# Patient Record
Sex: MALE | Race: BLACK OR AFRICAN AMERICAN | NOT HISPANIC OR LATINO | URBAN - METROPOLITAN AREA
[De-identification: names, ages, dates, MRNs, and addresses within clinical notes are randomized per-mention and may not be internally consistent; named-entity substitution may affect disease eponyms.]

---

## 2024-10-29 ENCOUNTER — INPATIENT (INPATIENT)
Facility: HOSPITAL | Age: 59
LOS: 3 days | Discharge: LEFT BEFORE DISCHARGE | End: 2024-11-02
Attending: HOSPITALIST | Admitting: HOSPITALIST
Payer: MEDICARE

## 2024-10-29 PROCEDURE — 99285 EMERGENCY DEPT VISIT HI MDM: CPT

## 2024-10-30 VITALS
HEART RATE: 125 BPM | WEIGHT: 229.94 LBS | DIASTOLIC BLOOD PRESSURE: 69 MMHG | SYSTOLIC BLOOD PRESSURE: 152 MMHG | OXYGEN SATURATION: 98 % | TEMPERATURE: 98 F | RESPIRATION RATE: 18 BRPM | HEIGHT: 67 IN

## 2024-10-30 DIAGNOSIS — I48.0 PAROXYSMAL ATRIAL FIBRILLATION: ICD-10-CM

## 2024-10-30 DIAGNOSIS — F11.90 OPIOID USE, UNSPECIFIED, UNCOMPLICATED: ICD-10-CM

## 2024-10-30 DIAGNOSIS — Z98.890 OTHER SPECIFIED POSTPROCEDURAL STATES: Chronic | ICD-10-CM

## 2024-10-30 DIAGNOSIS — Z29.9 ENCOUNTER FOR PROPHYLACTIC MEASURES, UNSPECIFIED: ICD-10-CM

## 2024-10-30 DIAGNOSIS — F10.239 ALCOHOL DEPENDENCE WITH WITHDRAWAL, UNSPECIFIED: ICD-10-CM

## 2024-10-30 DIAGNOSIS — E11.9 TYPE 2 DIABETES MELLITUS WITHOUT COMPLICATIONS: ICD-10-CM

## 2024-10-30 LAB
ADD ON TEST-SPECIMEN IN LAB: SIGNIFICANT CHANGE UP
ALBUMIN SERPL ELPH-MCNC: 3.5 G/DL — SIGNIFICANT CHANGE UP (ref 3.3–5)
ALBUMIN SERPL ELPH-MCNC: 3.9 G/DL — SIGNIFICANT CHANGE UP (ref 3.3–5)
ALP SERPL-CCNC: 103 U/L — SIGNIFICANT CHANGE UP (ref 40–120)
ALP SERPL-CCNC: 89 U/L — SIGNIFICANT CHANGE UP (ref 40–120)
ALT FLD-CCNC: 33 U/L — SIGNIFICANT CHANGE UP (ref 4–41)
ALT FLD-CCNC: 40 U/L — SIGNIFICANT CHANGE UP (ref 4–41)
AMPHET UR-MCNC: NEGATIVE — SIGNIFICANT CHANGE UP
ANION GAP SERPL CALC-SCNC: 13 MMOL/L — SIGNIFICANT CHANGE UP (ref 7–14)
ANION GAP SERPL CALC-SCNC: 15 MMOL/L — HIGH (ref 7–14)
AST SERPL-CCNC: 24 U/L — SIGNIFICANT CHANGE UP (ref 4–40)
AST SERPL-CCNC: 33 U/L — SIGNIFICANT CHANGE UP (ref 4–40)
BARBITURATES UR SCN-MCNC: NEGATIVE — SIGNIFICANT CHANGE UP
BASOPHILS # BLD AUTO: 0.07 K/UL — SIGNIFICANT CHANGE UP (ref 0–0.2)
BASOPHILS # BLD AUTO: 0.1 K/UL — SIGNIFICANT CHANGE UP (ref 0–0.2)
BASOPHILS NFR BLD AUTO: 0.8 % — SIGNIFICANT CHANGE UP (ref 0–2)
BASOPHILS NFR BLD AUTO: 1.1 % — SIGNIFICANT CHANGE UP (ref 0–2)
BENZODIAZ UR-MCNC: NEGATIVE — SIGNIFICANT CHANGE UP
BILIRUB SERPL-MCNC: 0.2 MG/DL — SIGNIFICANT CHANGE UP (ref 0.2–1.2)
BILIRUB SERPL-MCNC: 0.3 MG/DL — SIGNIFICANT CHANGE UP (ref 0.2–1.2)
BUN SERPL-MCNC: 10 MG/DL — SIGNIFICANT CHANGE UP (ref 7–23)
BUN SERPL-MCNC: 8 MG/DL — SIGNIFICANT CHANGE UP (ref 7–23)
CALCIUM SERPL-MCNC: 8 MG/DL — LOW (ref 8.4–10.5)
CALCIUM SERPL-MCNC: 8.4 MG/DL — SIGNIFICANT CHANGE UP (ref 8.4–10.5)
CHLORIDE SERPL-SCNC: 101 MMOL/L — SIGNIFICANT CHANGE UP (ref 98–107)
CHLORIDE SERPL-SCNC: 101 MMOL/L — SIGNIFICANT CHANGE UP (ref 98–107)
CO2 SERPL-SCNC: 25 MMOL/L — SIGNIFICANT CHANGE UP (ref 22–31)
CO2 SERPL-SCNC: 26 MMOL/L — SIGNIFICANT CHANGE UP (ref 22–31)
COCAINE METAB.OTHER UR-MCNC: POSITIVE
CREAT SERPL-MCNC: 0.65 MG/DL — SIGNIFICANT CHANGE UP (ref 0.5–1.3)
CREAT SERPL-MCNC: 0.72 MG/DL — SIGNIFICANT CHANGE UP (ref 0.5–1.3)
CREATININE URINE RESULT, DAU: 237 MG/DL — SIGNIFICANT CHANGE UP
EGFR: 106 ML/MIN/1.73M2 — SIGNIFICANT CHANGE UP
EGFR: 109 ML/MIN/1.73M2 — SIGNIFICANT CHANGE UP
EOSINOPHIL # BLD AUTO: 0.47 K/UL — SIGNIFICANT CHANGE UP (ref 0–0.5)
EOSINOPHIL # BLD AUTO: 0.56 K/UL — HIGH (ref 0–0.5)
EOSINOPHIL NFR BLD AUTO: 5.3 % — SIGNIFICANT CHANGE UP (ref 0–6)
EOSINOPHIL NFR BLD AUTO: 6.3 % — HIGH (ref 0–6)
ETHANOL SERPL-MCNC: 135 MG/DL — HIGH
FENTANYL UR QL SCN: POSITIVE
GLUCOSE BLDC GLUCOMTR-MCNC: 118 MG/DL — HIGH (ref 70–99)
GLUCOSE BLDC GLUCOMTR-MCNC: 87 MG/DL — SIGNIFICANT CHANGE UP (ref 70–99)
GLUCOSE SERPL-MCNC: 108 MG/DL — HIGH (ref 70–99)
GLUCOSE SERPL-MCNC: 129 MG/DL — HIGH (ref 70–99)
HCT VFR BLD CALC: 37.1 % — LOW (ref 39–50)
HCT VFR BLD CALC: 41 % — SIGNIFICANT CHANGE UP (ref 39–50)
HGB BLD-MCNC: 11.9 G/DL — LOW (ref 13–17)
HGB BLD-MCNC: 13.5 G/DL — SIGNIFICANT CHANGE UP (ref 13–17)
IANC: 5.04 K/UL — SIGNIFICANT CHANGE UP (ref 1.8–7.4)
IANC: 5.29 K/UL — SIGNIFICANT CHANGE UP (ref 1.8–7.4)
IMM GRANULOCYTES NFR BLD AUTO: 0.3 % — SIGNIFICANT CHANGE UP (ref 0–0.9)
IMM GRANULOCYTES NFR BLD AUTO: 0.6 % — SIGNIFICANT CHANGE UP (ref 0–0.9)
LIDOCAIN IGE QN: 29 U/L — SIGNIFICANT CHANGE UP (ref 7–60)
LYMPHOCYTES # BLD AUTO: 2.03 K/UL — SIGNIFICANT CHANGE UP (ref 1–3.3)
LYMPHOCYTES # BLD AUTO: 2.22 K/UL — SIGNIFICANT CHANGE UP (ref 1–3.3)
LYMPHOCYTES # BLD AUTO: 22.9 % — SIGNIFICANT CHANGE UP (ref 13–44)
LYMPHOCYTES # BLD AUTO: 25 % — SIGNIFICANT CHANGE UP (ref 13–44)
MAGNESIUM SERPL-MCNC: 1.4 MG/DL — LOW (ref 1.6–2.6)
MAGNESIUM SERPL-MCNC: 1.7 MG/DL — SIGNIFICANT CHANGE UP (ref 1.6–2.6)
MCHC RBC-ENTMCNC: 30.1 PG — SIGNIFICANT CHANGE UP (ref 27–34)
MCHC RBC-ENTMCNC: 30.5 PG — SIGNIFICANT CHANGE UP (ref 27–34)
MCHC RBC-ENTMCNC: 32.1 G/DL — SIGNIFICANT CHANGE UP (ref 32–36)
MCHC RBC-ENTMCNC: 32.9 G/DL — SIGNIFICANT CHANGE UP (ref 32–36)
MCV RBC AUTO: 92.8 FL — SIGNIFICANT CHANGE UP (ref 80–100)
MCV RBC AUTO: 93.7 FL — SIGNIFICANT CHANGE UP (ref 80–100)
METHADONE UR-MCNC: POSITIVE
MONOCYTES # BLD AUTO: 0.86 K/UL — SIGNIFICANT CHANGE UP (ref 0–0.9)
MONOCYTES # BLD AUTO: 1.03 K/UL — HIGH (ref 0–0.9)
MONOCYTES NFR BLD AUTO: 11.6 % — SIGNIFICANT CHANGE UP (ref 2–14)
MONOCYTES NFR BLD AUTO: 9.7 % — SIGNIFICANT CHANGE UP (ref 2–14)
MRSA PCR RESULT.: SIGNIFICANT CHANGE UP
NEUTROPHILS # BLD AUTO: 5.04 K/UL — SIGNIFICANT CHANGE UP (ref 1.8–7.4)
NEUTROPHILS # BLD AUTO: 5.29 K/UL — SIGNIFICANT CHANGE UP (ref 1.8–7.4)
NEUTROPHILS NFR BLD AUTO: 56.7 % — SIGNIFICANT CHANGE UP (ref 43–77)
NEUTROPHILS NFR BLD AUTO: 59.7 % — SIGNIFICANT CHANGE UP (ref 43–77)
NRBC # BLD: 0 /100 WBCS — SIGNIFICANT CHANGE UP (ref 0–0)
NRBC # BLD: 0 /100 WBCS — SIGNIFICANT CHANGE UP (ref 0–0)
NRBC # FLD: 0 K/UL — SIGNIFICANT CHANGE UP (ref 0–0)
NRBC # FLD: 0 K/UL — SIGNIFICANT CHANGE UP (ref 0–0)
OPIATES UR-MCNC: POSITIVE
OXYCODONE UR-MCNC: NEGATIVE — SIGNIFICANT CHANGE UP
PCP SPEC-MCNC: SIGNIFICANT CHANGE UP
PCP UR-MCNC: NEGATIVE — SIGNIFICANT CHANGE UP
PHOSPHATE SERPL-MCNC: 2.8 MG/DL — SIGNIFICANT CHANGE UP (ref 2.5–4.5)
PHOSPHATE SERPL-MCNC: 2.9 MG/DL — SIGNIFICANT CHANGE UP (ref 2.5–4.5)
PLATELET # BLD AUTO: 392 K/UL — SIGNIFICANT CHANGE UP (ref 150–400)
PLATELET # BLD AUTO: 464 K/UL — HIGH (ref 150–400)
POTASSIUM SERPL-MCNC: 3.1 MMOL/L — LOW (ref 3.5–5.3)
POTASSIUM SERPL-MCNC: 3.2 MMOL/L — LOW (ref 3.5–5.3)
POTASSIUM SERPL-SCNC: 3.1 MMOL/L — LOW (ref 3.5–5.3)
POTASSIUM SERPL-SCNC: 3.2 MMOL/L — LOW (ref 3.5–5.3)
PROT SERPL-MCNC: 5.7 G/DL — LOW (ref 6–8.3)
PROT SERPL-MCNC: 6.7 G/DL — SIGNIFICANT CHANGE UP (ref 6–8.3)
RBC # BLD: 3.96 M/UL — LOW (ref 4.2–5.8)
RBC # BLD: 4.42 M/UL — SIGNIFICANT CHANGE UP (ref 4.2–5.8)
RBC # FLD: 14.2 % — SIGNIFICANT CHANGE UP (ref 10.3–14.5)
RBC # FLD: 14.4 % — SIGNIFICANT CHANGE UP (ref 10.3–14.5)
S AUREUS DNA NOSE QL NAA+PROBE: SIGNIFICANT CHANGE UP
SODIUM SERPL-SCNC: 140 MMOL/L — SIGNIFICANT CHANGE UP (ref 135–145)
SODIUM SERPL-SCNC: 141 MMOL/L — SIGNIFICANT CHANGE UP (ref 135–145)
THC UR QL: NEGATIVE — SIGNIFICANT CHANGE UP
TROPONIN T, HIGH SENSITIVITY RESULT: 10 NG/L — SIGNIFICANT CHANGE UP
WBC # BLD: 8.86 K/UL — SIGNIFICANT CHANGE UP (ref 3.8–10.5)
WBC # BLD: 8.89 K/UL — SIGNIFICANT CHANGE UP (ref 3.8–10.5)
WBC # FLD AUTO: 8.86 K/UL — SIGNIFICANT CHANGE UP (ref 3.8–10.5)
WBC # FLD AUTO: 8.89 K/UL — SIGNIFICANT CHANGE UP (ref 3.8–10.5)

## 2024-10-30 PROCEDURE — 99223 1ST HOSP IP/OBS HIGH 75: CPT

## 2024-10-30 RX ORDER — GLUCAGON INJECTION, SOLUTION 1 MG/.2ML
1 INJECTION, SOLUTION SUBCUTANEOUS ONCE
Refills: 0 | Status: DISCONTINUED | OUTPATIENT
Start: 2024-10-30 | End: 2024-11-02

## 2024-10-30 RX ORDER — CHLORDIAZEPOXIDE HCL 10 MG
25 CAPSULE ORAL ONCE
Refills: 0 | Status: DISCONTINUED | OUTPATIENT
Start: 2024-10-30 | End: 2024-10-30

## 2024-10-30 RX ORDER — CHLORDIAZEPOXIDE HCL 10 MG
50 CAPSULE ORAL EVERY 6 HOURS
Refills: 0 | Status: DISCONTINUED | OUTPATIENT
Start: 2024-10-30 | End: 2024-10-31

## 2024-10-30 RX ORDER — MAGNESIUM SULFATE IN 0.9% NACL 2 G/50 ML
2 INTRAVENOUS SOLUTION, PIGGYBACK (ML) INTRAVENOUS ONCE
Refills: 0 | Status: COMPLETED | OUTPATIENT
Start: 2024-10-30 | End: 2024-10-30

## 2024-10-30 RX ORDER — CHLORDIAZEPOXIDE HCL 10 MG
50 CAPSULE ORAL EVERY 8 HOURS
Refills: 0 | Status: DISCONTINUED | OUTPATIENT
Start: 2024-10-31 | End: 2024-11-01

## 2024-10-30 RX ORDER — LORAZEPAM 2 MG
2 TABLET ORAL EVERY 4 HOURS
Refills: 0 | Status: DISCONTINUED | OUTPATIENT
Start: 2024-10-30 | End: 2024-10-30

## 2024-10-30 RX ORDER — DIAZEPAM 10 MG/1
5 FILM BUCCAL ONCE
Refills: 0 | Status: DISCONTINUED | OUTPATIENT
Start: 2024-10-30 | End: 2024-10-30

## 2024-10-30 RX ORDER — CLOPIDOGREL 75 MG/1
75 TABLET ORAL DAILY
Refills: 0 | Status: DISCONTINUED | OUTPATIENT
Start: 2024-10-30 | End: 2024-11-02

## 2024-10-30 RX ORDER — MONTELUKAST SODIUM 10 MG/1
10 TABLET, FILM COATED ORAL DAILY
Refills: 0 | Status: DISCONTINUED | OUTPATIENT
Start: 2024-10-30 | End: 2024-11-02

## 2024-10-30 RX ORDER — MIDAZOLAM IN 5 % DEXTROSE/PF 15 MG/30ML
2 SYRINGE (ML) INTRAVENOUS ONCE
Refills: 0 | Status: DISCONTINUED | OUTPATIENT
Start: 2024-10-30 | End: 2024-10-30

## 2024-10-30 RX ORDER — THIAMINE HCL 100 MG
100 TABLET ORAL DAILY
Refills: 0 | Status: COMPLETED | OUTPATIENT
Start: 2024-10-30 | End: 2024-11-01

## 2024-10-30 RX ORDER — CHLORDIAZEPOXIDE HCL 10 MG
CAPSULE ORAL
Refills: 0 | Status: DISCONTINUED | OUTPATIENT
Start: 2024-10-30 | End: 2024-11-02

## 2024-10-30 RX ORDER — B-COMPLEX WITH VITAMIN C
1 VIAL (ML) INJECTION DAILY
Refills: 0 | Status: DISCONTINUED | OUTPATIENT
Start: 2024-10-30 | End: 2024-11-02

## 2024-10-30 RX ORDER — METFORMIN HYDROCHLORIDE 500 MG/1
1 TABLET, EXTENDED RELEASE ORAL
Refills: 0 | DISCHARGE

## 2024-10-30 RX ORDER — ENOXAPARIN SODIUM 40MG/0.4ML
40 SYRINGE (ML) SUBCUTANEOUS EVERY 24 HOURS
Refills: 0 | Status: DISCONTINUED | OUTPATIENT
Start: 2024-10-30 | End: 2024-11-02

## 2024-10-30 RX ORDER — CLOPIDOGREL 75 MG/1
1 TABLET ORAL
Refills: 0 | DISCHARGE

## 2024-10-30 RX ORDER — POTASSIUM CHLORIDE 10 MEQ
40 TABLET, EXTENDED RELEASE ORAL EVERY 4 HOURS
Refills: 0 | Status: COMPLETED | OUTPATIENT
Start: 2024-10-30 | End: 2024-10-30

## 2024-10-30 RX ORDER — THIAMINE HCL 100 MG
100 TABLET ORAL ONCE
Refills: 0 | Status: COMPLETED | OUTPATIENT
Start: 2024-10-30 | End: 2024-10-30

## 2024-10-30 RX ORDER — PANTOPRAZOLE SODIUM 40 MG/1
40 TABLET, DELAYED RELEASE ORAL ONCE
Refills: 0 | Status: COMPLETED | OUTPATIENT
Start: 2024-10-30 | End: 2024-10-30

## 2024-10-30 RX ORDER — LORAZEPAM 2 MG
2 TABLET ORAL
Refills: 0 | Status: DISCONTINUED | OUTPATIENT
Start: 2024-10-30 | End: 2024-10-30

## 2024-10-30 RX ORDER — LORAZEPAM 2 MG
TABLET ORAL
Refills: 0 | Status: DISCONTINUED | OUTPATIENT
Start: 2024-10-30 | End: 2024-10-30

## 2024-10-30 RX ORDER — FOLIC ACID 1 MG/1
1 TABLET ORAL ONCE
Refills: 0 | Status: COMPLETED | OUTPATIENT
Start: 2024-10-30 | End: 2024-10-30

## 2024-10-30 RX ORDER — ACETAMINOPHEN 500 MG
650 TABLET ORAL EVERY 6 HOURS
Refills: 0 | Status: DISCONTINUED | OUTPATIENT
Start: 2024-10-30 | End: 2024-11-02

## 2024-10-30 RX ORDER — INSULIN LISPRO 100/ML
VIAL (ML) SUBCUTANEOUS
Refills: 0 | Status: DISCONTINUED | OUTPATIENT
Start: 2024-10-30 | End: 2024-11-02

## 2024-10-30 RX ORDER — FOLIC ACID 1 MG/1
1 TABLET ORAL DAILY
Refills: 0 | Status: DISCONTINUED | OUTPATIENT
Start: 2024-10-30 | End: 2024-11-02

## 2024-10-30 RX ORDER — INSULIN LISPRO 100/ML
VIAL (ML) SUBCUTANEOUS AT BEDTIME
Refills: 0 | Status: DISCONTINUED | OUTPATIENT
Start: 2024-10-30 | End: 2024-11-02

## 2024-10-30 RX ORDER — ALBUTEROL 90 MCG
2 AEROSOL (GRAM) INHALATION
Refills: 0 | DISCHARGE

## 2024-10-30 RX ORDER — POTASSIUM CHLORIDE 10 MEQ
40 TABLET, EXTENDED RELEASE ORAL ONCE
Refills: 0 | Status: COMPLETED | OUTPATIENT
Start: 2024-10-30 | End: 2024-10-30

## 2024-10-30 RX ORDER — FLUTICASONE PROPIONATE AND SALMETEROL XINAFOATE 230; 21 UG/1; UG/1
1 AEROSOL, METERED RESPIRATORY (INHALATION)
Refills: 0 | Status: DISCONTINUED | OUTPATIENT
Start: 2024-10-30 | End: 2024-11-02

## 2024-10-30 RX ORDER — CHLORHEXIDINE GLUCONATE 40 MG/ML
1 SOLUTION TOPICAL DAILY
Refills: 0 | Status: DISCONTINUED | OUTPATIENT
Start: 2024-10-30 | End: 2024-11-02

## 2024-10-30 RX ORDER — MONTELUKAST SODIUM 10 MG/1
1 TABLET, FILM COATED ORAL
Refills: 0 | DISCHARGE

## 2024-10-30 RX ORDER — CHLORDIAZEPOXIDE HCL 10 MG
50 CAPSULE ORAL EVERY 24 HOURS
Refills: 0 | Status: CANCELLED | OUTPATIENT
Start: 2024-11-03 | End: 2024-11-02

## 2024-10-30 RX ORDER — CHLORDIAZEPOXIDE HCL 10 MG
50 CAPSULE ORAL EVERY 12 HOURS
Refills: 0 | Status: COMPLETED | OUTPATIENT
Start: 2024-11-01 | End: 2024-11-02

## 2024-10-30 RX ORDER — ALBUTEROL 90 MCG
2 AEROSOL (GRAM) INHALATION EVERY 6 HOURS
Refills: 0 | Status: DISCONTINUED | OUTPATIENT
Start: 2024-10-30 | End: 2024-11-02

## 2024-10-30 RX ADMIN — FLUTICASONE PROPIONATE AND SALMETEROL XINAFOATE 1 DOSE(S): 230; 21 AEROSOL, METERED RESPIRATORY (INHALATION) at 09:50

## 2024-10-30 RX ADMIN — Medication 25 MILLIGRAM(S): at 01:47

## 2024-10-30 RX ADMIN — Medication 100 MILLIGRAM(S): at 01:48

## 2024-10-30 RX ADMIN — Medication 2 PUFF(S): at 12:07

## 2024-10-30 RX ADMIN — MONTELUKAST SODIUM 10 MILLIGRAM(S): 10 TABLET, FILM COATED ORAL at 12:07

## 2024-10-30 RX ADMIN — Medication 1000 MILLILITER(S): at 01:48

## 2024-10-30 RX ADMIN — Medication 50 MILLIGRAM(S): at 12:07

## 2024-10-30 RX ADMIN — FOLIC ACID 1 MILLIGRAM(S): 1 TABLET ORAL at 01:48

## 2024-10-30 RX ADMIN — Medication 25 GRAM(S): at 13:05

## 2024-10-30 RX ADMIN — Medication 1: at 13:06

## 2024-10-30 RX ADMIN — FLUTICASONE PROPIONATE AND SALMETEROL XINAFOATE 1 DOSE(S): 230; 21 AEROSOL, METERED RESPIRATORY (INHALATION) at 20:45

## 2024-10-30 RX ADMIN — FOLIC ACID 1 MILLIGRAM(S): 1 TABLET ORAL at 12:07

## 2024-10-30 RX ADMIN — PANTOPRAZOLE SODIUM 40 MILLIGRAM(S): 40 TABLET, DELAYED RELEASE ORAL at 01:47

## 2024-10-30 RX ADMIN — Medication 20 MILLIGRAM(S): at 21:39

## 2024-10-30 RX ADMIN — Medication 50 MILLIGRAM(S): at 05:51

## 2024-10-30 RX ADMIN — DIAZEPAM 5 MILLIGRAM(S): 10 FILM BUCCAL at 01:47

## 2024-10-30 RX ADMIN — Medication 40 MILLIEQUIVALENT(S): at 02:41

## 2024-10-30 RX ADMIN — Medication 40 MILLIEQUIVALENT(S): at 13:05

## 2024-10-30 RX ADMIN — Medication 1 TABLET(S): at 12:07

## 2024-10-30 RX ADMIN — CHLORHEXIDINE GLUCONATE 1 APPLICATION(S): 40 SOLUTION TOPICAL at 12:06

## 2024-10-30 RX ADMIN — Medication 40 MILLIEQUIVALENT(S): at 18:12

## 2024-10-30 RX ADMIN — Medication 1000 MILLILITER(S): at 02:41

## 2024-10-30 RX ADMIN — Medication 100 MILLIGRAM(S): at 12:07

## 2024-10-30 RX ADMIN — Medication 40 MILLIGRAM(S): at 07:34

## 2024-10-30 RX ADMIN — CLOPIDOGREL 75 MILLIGRAM(S): 75 TABLET ORAL at 12:07

## 2024-10-30 RX ADMIN — Medication 50 MILLIGRAM(S): at 18:12

## 2024-10-30 NOTE — DIETITIAN INITIAL EVALUATION ADULT - NSICDXPASTMEDICALHX_GEN_ALL_CORE_FT
PAST MEDICAL HISTORY:  Asthma     DM (diabetes mellitus)     History of alcohol use disorder     Paroxysmal atrial fibrillation

## 2024-10-30 NOTE — H&P ADULT - NSHPREVIEWOFSYSTEMS_GEN_ALL_CORE

## 2024-10-30 NOTE — PROGRESS NOTE ADULT - SUBJECTIVE AND OBJECTIVE BOX
****[facility] PGY-1*****    CHIEF COMPLAINT: etoh and drug withdrawal    Overnight Events: admitted overnight and started on CIWA  Interval Events:     OBJECTIVE:  ICU Vital Signs Last 24 Hrs  T(C): 36.8 (30 Oct 2024 04:25), Max: 36.9 (30 Oct 2024 04:25)  T(F): 98.3 (30 Oct 2024 04:25), Max: 98.5 (30 Oct 2024 04:25)  HR: 100 (30 Oct 2024 04:25) (95 - 125)  BP: 146/91 (30 Oct 2024 04:25) (146/91 - 152/91)  BP(mean): 106 (30 Oct 2024 04:25) (106 - 118)  ABP: --  ABP(mean): --  RR: 16 (30 Oct 2024 04:25) (12 - 18)  SpO2: 94% (30 Oct 2024 04:25) (94% - 98%)    O2 Parameters below as of 30 Oct 2024 04:25  Patient On (Oxygen Delivery Method): room air              CAPILLARY BLOOD GLUCOSE      POCT Blood Glucose.: 118 mg/dL (30 Oct 2024 05:49)      PHYSICAL EXAM  General:   Head:    Eyes:   Neck:   Cardiac:   Lungs:   Abdomen:   Extremities:   Neuro  Psych:   Skin:  Etc:      HOSPITAL MEDICATIONS:  MEDICATIONS  (STANDING):  atorvastatin 20 milliGRAM(s) Oral at bedtime  chlordiazePOXIDE   Oral   chlordiazePOXIDE 50 milliGRAM(s) Oral every 6 hours  chlorhexidine 2% Cloths 1 Application(s) Topical daily  clopidogrel Tablet 75 milliGRAM(s) Oral daily  dextrose 5%. 1000 milliLiter(s) (50 mL/Hr) IV Continuous <Continuous>  dextrose 50% Injectable 25 Gram(s) IV Push once  enoxaparin Injectable 40 milliGRAM(s) SubCutaneous every 24 hours  fluticasone propionate/ salmeterol 250-50 MICROgram(s) Diskus 1 Dose(s) Inhalation two times a day  folic acid 1 milliGRAM(s) Oral daily  glucagon  Injectable 1 milliGRAM(s) IntraMuscular once  insulin lispro (ADMELOG) corrective regimen sliding scale   SubCutaneous three times a day before meals  insulin lispro (ADMELOG) corrective regimen sliding scale   SubCutaneous at bedtime  montelukast 10 milliGRAM(s) Oral daily  multivitamin 1 Tablet(s) Oral daily  thiamine 100 milliGRAM(s) Oral daily    MEDICATIONS  (PRN):  acetaminophen     Tablet .. 650 milliGRAM(s) Oral every 6 hours PRN Temp greater or equal to 38C (100.4F), Mild Pain (1 - 3)  albuterol    90 MICROgram(s) HFA Inhaler 2 Puff(s) Inhalation every 6 hours PRN Shortness of Breath and/or Wheezing  dextrose Oral Gel 15 Gram(s) Oral once PRN Blood Glucose LESS THAN 70 milliGRAM(s)/deciliter      LABS:                        13.5   8.89  )-----------( 464      ( 30 Oct 2024 01:30 )             41.0     Hgb Trend: 13.5<--  10-30    141  |  101  |  10  ----------------------------<  108[H]  3.2[L]   |  25  |  0.72    Ca    8.4      30 Oct 2024 01:30  Phos  2.9     10-30  Mg     1.70     10-30    TPro  6.7  /  Alb  3.9  /  TBili  0.2  /  DBili  x   /  AST  33  /  ALT  40  /  AlkPhos  103  10-30    Creatinine Trend: 0.72<--    Urinalysis Basic - ( 30 Oct 2024 01:30 )    Color: x / Appearance: x / SG: x / pH: x  Gluc: 108 mg/dL / Ketone: x  / Bili: x / Urobili: x   Blood: x / Protein: x / Nitrite: x   Leuk Esterase: x / RBC: x / WBC x   Sq Epi: x / Non Sq Epi: x / Bacteria: x            MICROBIOLOGY:       RADIOLOGY:  [ ] Reviewed by me   ****[facility] PGY-1*****    CHIEF COMPLAINT: etoh and drug withdrawal    Overnight Events: admitted overnight and started on CIWA  No F/C, N/V, CP, SOB, Cough, lightheadedness, dizziness, abdominal pain, diarrhea, dysuria.    Interval Events:     OBJECTIVE:  ICU Vital Signs Last 24 Hrs  T(C): 36.8 (30 Oct 2024 04:25), Max: 36.9 (30 Oct 2024 04:25)  T(F): 98.3 (30 Oct 2024 04:25), Max: 98.5 (30 Oct 2024 04:25)  HR: 100 (30 Oct 2024 04:25) (95 - 125)  BP: 146/91 (30 Oct 2024 04:25) (146/91 - 152/91)  BP(mean): 106 (30 Oct 2024 04:25) (106 - 118)  ABP: --  ABP(mean): --  RR: 16 (30 Oct 2024 04:25) (12 - 18)  SpO2: 94% (30 Oct 2024 04:25) (94% - 98%)    O2 Parameters below as of 30 Oct 2024 04:25  Patient On (Oxygen Delivery Method): room air              CAPILLARY BLOOD GLUCOSE      POCT Blood Glucose.: 118 mg/dL (30 Oct 2024 05:49)      PHYSICAL EXAM  General:   Head:    Eyes:   Neck:   Cardiac:   Lungs:   Abdomen:   Extremities:   Neuro  Psych:   Skin:  Etc:      HOSPITAL MEDICATIONS:  MEDICATIONS  (STANDING):  atorvastatin 20 milliGRAM(s) Oral at bedtime  chlordiazePOXIDE   Oral   chlordiazePOXIDE 50 milliGRAM(s) Oral every 6 hours  chlorhexidine 2% Cloths 1 Application(s) Topical daily  clopidogrel Tablet 75 milliGRAM(s) Oral daily  dextrose 5%. 1000 milliLiter(s) (50 mL/Hr) IV Continuous <Continuous>  dextrose 50% Injectable 25 Gram(s) IV Push once  enoxaparin Injectable 40 milliGRAM(s) SubCutaneous every 24 hours  fluticasone propionate/ salmeterol 250-50 MICROgram(s) Diskus 1 Dose(s) Inhalation two times a day  folic acid 1 milliGRAM(s) Oral daily  glucagon  Injectable 1 milliGRAM(s) IntraMuscular once  insulin lispro (ADMELOG) corrective regimen sliding scale   SubCutaneous three times a day before meals  insulin lispro (ADMELOG) corrective regimen sliding scale   SubCutaneous at bedtime  montelukast 10 milliGRAM(s) Oral daily  multivitamin 1 Tablet(s) Oral daily  thiamine 100 milliGRAM(s) Oral daily    MEDICATIONS  (PRN):  acetaminophen     Tablet .. 650 milliGRAM(s) Oral every 6 hours PRN Temp greater or equal to 38C (100.4F), Mild Pain (1 - 3)  albuterol    90 MICROgram(s) HFA Inhaler 2 Puff(s) Inhalation every 6 hours PRN Shortness of Breath and/or Wheezing  dextrose Oral Gel 15 Gram(s) Oral once PRN Blood Glucose LESS THAN 70 milliGRAM(s)/deciliter      LABS:                        13.5   8.89  )-----------( 464      ( 30 Oct 2024 01:30 )             41.0     Hgb Trend: 13.5<--  10-30    141  |  101  |  10  ----------------------------<  108[H]  3.2[L]   |  25  |  0.72    Ca    8.4      30 Oct 2024 01:30  Phos  2.9     10-30  Mg     1.70     10-30    TPro  6.7  /  Alb  3.9  /  TBili  0.2  /  DBili  x   /  AST  33  /  ALT  40  /  AlkPhos  103  10-30    Creatinine Trend: 0.72<--    Urinalysis Basic - ( 30 Oct 2024 01:30 )    Color: x / Appearance: x / SG: x / pH: x  Gluc: 108 mg/dL / Ketone: x  / Bili: x / Urobili: x   Blood: x / Protein: x / Nitrite: x   Leuk Esterase: x / RBC: x / WBC x   Sq Epi: x / Non Sq Epi: x / Bacteria: x            MICROBIOLOGY:       RADIOLOGY:  [ ] Reviewed by me

## 2024-10-30 NOTE — H&P ADULT - NSHPPHYSICALEXAM_GEN_ALL_CORE
VITAL SIGNS:  T(C): 36.5 (10-30-24 @ 00:55), Max: 36.8 (10-30-24 @ 00:01)  T(F): 97.7 (10-30-24 @ 00:55), Max: 98.3 (10-30-24 @ 00:01)  HR: 108 (10-30-24 @ 00:55) (108 - 125)  BP: 149/106 (10-30-24 @ 00:55) (149/106 - 152/69)  BP(mean): 118 (10-30-24 @ 00:55) (118 - 118)  RR: 12 (10-30-24 @ 00:55) (12 - 18)  SpO2: 95% (10-30-24 @ 00:55) (95% - 98%)  Wt(kg): --    PHYSICAL EXAM:    Constitutional: resting comfortably in bed; NAD  Head: NC/AT  Eyes: PERRL, EOMI, anicteric sclera  ENT: no nasal discharge; uvula midline, no oropharyngeal erythema or exudates; MMM  Neck: supple  Respiratory: CTA B/L; no W/R/R, no retractions  Cardiac: +S1/S2; RRR; no M/R/G  Gastrointestinal: abdomen soft, NT/ND; no rebound or guarding; +BSx4  Back: spine midline, no bony tenderness  Extremities: WWP, no clubbing or cyanosis; no peripheral edema  Musculoskeletal: NROM x4; no joint swelling, tenderness or erythema  Vascular: distal pulses intact  Dermatologic: skin warm, dry and intact; no rashes  Lymphatic: no submandibular or cervical LAD  Neurologic: AAOx3; moves all 4 extremities  Psychiatric: affect and characteristics of appearance, verbalizations, behaviors are appropriate

## 2024-10-30 NOTE — DIETITIAN INITIAL EVALUATION ADULT - PERTINENT MEDS FT
atorvastatin   dextrose 5% IV Continuous   folic acid   ADMELOG corrective regimen sliding scale  magnesium sulfate IV  multivitamin   potassium chloride Tablet ER   thiamine
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
## 2024-10-30 NOTE — ED ADULT NURSE NOTE - OBJECTIVE STATEMENT
Pt received from triage on stretcher, A/Ox4 answers all questions appropriately. C/O Dizziness w/ associated SOB x several days. Pt describes sensation of "heart feels like its fluttering". Pt denies recent fever/chills, N/V. Pt denies chest pain and SOB during initial assessment, breathing is even and unlabored on room air. Abdomen is soft and non-distended, non-tender to touch. Pt ambulates independently at baseline, moves all four extremities on command, skin is intact. Pt resting comfortably at the bedside, no apparent acute visible distress noted on initial assessment. Vital signs stable, NKA to medications. PMHx HTN DM AFIB Asthma. Pending provider orders Pt received from triage on stretcher, A/Ox4 answers all questions appropriately. C/O Dizziness w/ associated SOB x several days. Pt describes sensation of "heart feels like its fluttering". Endorsing alcohol use today in the AM, states he has previous hx of ETOH withdrawals and ETOH related seizures. CIWA assessment performed at the bedside with ED Resident MD at the bedside, refer to flow sheet. Pt denies recent fever/chills, N/V. Pt denies chest pain and SOB during initial assessment, breathing is even and unlabored on room air. Abdomen is soft and non-distended, non-tender to touch. Pt ambulates independently at baseline, moves all four extremities on command, skin is intact. Pt resting comfortably at the bedside, no apparent acute visible distress noted on initial assessment. Vital signs stable, NKA to medications. PMHx HTN DM AFIB Asthma. Pending provider orders Pt received from triage on stretcher, A/Ox4 answers all questions appropriately. C/O Dizziness w/ associated SOB x several days. Pt describes sensation of "heart feels like its fluttering". Endorsing alcohol use today in the AM, states he has previous hx of ETOH withdrawals and ETOH related seizures. CIWA assessment performed at the bedside with ED Resident MD at the bedside, refer to flow sheet. Pt denies recent fever/chills, N/V. Pt denies chest pain and SOB during initial assessment, breathing is even and unlabored on room air. Abdomen is soft and non-distended, non-tender to touch. Pt ambulates independently at baseline, moves all four extremities on command, skin is intact. Pt resting comfortably at the bedside, no apparent acute visible distress noted on initial assessment. Vital signs stable, NKA to medications. PMHx HTN, DM, AFIB, Asthma, ETOH Abuse. Pending provider orders Pt received from triage on stretcher, A/Ox4 answers all questions appropriately. C/O Dizziness w/ associated SOB x several days. Pt describes sensation of "heart feels like its fluttering". Endorsing alcohol use today in the AM, states he has previous hx of ETOH withdrawals and ETOH related seizures. CIWA assessment performed at the bedside with ED Resident MD at the bedside, refer to flow sheet. Pt denies recent fever/chills, N/V. Pt placed on continuous tele monitoring, sinus tach observed on the monitor. Pt denies chest pain and SOB during initial assessment, breathing is even and unlabored on room air. Abdomen is soft and non-distended, non-tender to touch. Pt ambulates independently at baseline, moves all four extremities on command, skin is intact. Pt resting comfortably at the bedside, no apparent acute visible distress noted on initial assessment. Vital signs stable, NKA to medications. PMHx HTN, DM, AFIB, Asthma, ETOH Abuse. Pending provider orders

## 2024-10-30 NOTE — PROVIDER CONTACT NOTE (OTHER) - BACKGROUND
Pt admitted for alcohol dependence with withdrawal. Hx of asthma, paroxysmal atrial fibrillation.
Pt admitted for alcohol dependence with withdrawal. Hx of asthma, paroxysmal atrial fibrillation.

## 2024-10-30 NOTE — ED ADULT NURSE REASSESSMENT NOTE - NS ED NURSE REASSESS COMMENT FT1
RN handoff provided at this time via telephone to [address]. Pt pending transport to 77.
Break RN: Pt received in room 5. pt A&Ox4, ambulatory, visible tremor noted, has hx of withdrawal, medicated as per ordered. NSR on monitor at present. Pending further order.

## 2024-10-30 NOTE — DIETITIAN INITIAL EVALUATION ADULT - PROBLEM SELECTOR PLAN 4
[id number] score 1. Pt states previously on AC however now just on plavix  - can continue plavix for now

## 2024-10-30 NOTE — ED PROVIDER NOTE - OBJECTIVE STATEMENT
59-year-old male with past medical history of alcohol use disorder, previous hospitalizations for withdrawal with 1 previous seizure, no ICU stays, presents emergency department due to increased anxiety, ants crawling on skin sensation, palpitations starting earlier today.  He says he has been drinking alcohol all day, recently relapsed in the last several weeks.  He denies any fevers, chills, cough.

## 2024-10-30 NOTE — PROVIDER CONTACT NOTE (OTHER) - ASSESSMENT
Pt alert and calm in bed. No s/s of acute distress noted.
Pt refuses bed alarm. Pt educated on fall and injury risks. pt understood education.

## 2024-10-30 NOTE — H&P ADULT - PROBLEM SELECTOR PLAN 4
[id number] score 1. Pt states previously on AC however now just on plavix  - can continue plavix for now

## 2024-10-30 NOTE — H&P ADULT - TIME BILLING
I have spent a total of 78 minutes time spent to prepare to see the patient, obtaining and reviewing history, physical examination, explaining the diagnosis, prognosis and treatment plan with the patient/family/caregiver. I also have spent the time ordering studies and testing, interpreting results, medicine reconciliation, and documentation as above.

## 2024-10-30 NOTE — PROGRESS NOTE ADULT - PROBLEM SELECTOR PLAN 2
Pt reports sniffing heroin, last use was yesterday, previously was 3 weeks ago.   - monitor for signs of withdrawal  - f/u utox  ->> positive for cocaine, opiates, meth, fentanyl Pt reports sniffing heroin, last use was yesterday, previously was 3 weeks ago.     He reports that approx 6 days ago he tried to stop using opiates and was taking suboxone for a few days, but stopped the suboxone because he did not want to get addicted to it too.    Plan:  - monitor for signs of withdrawal  - f/u utox  ->> positive for cocaine, opiates, methadone, fentanyl

## 2024-10-30 NOTE — H&P ADULT - NSHPLABSRESULTS_GEN_ALL_CORE
.  LABS:                         13.5   8.89  )-----------( 464      ( 30 Oct 2024 01:30 )             41.0     10-30    141  |  101  |  10  ----------------------------<  108[H]  3.2[L]   |  25  |  0.72    Ca    8.4      30 Oct 2024 01:30    TPro  6.7  /  Alb  3.9  /  TBili  0.2  /  DBili  x   /  AST  33  /  ALT  40  /  AlkPhos  103  10-30      Urinalysis Basic - ( 30 Oct 2024 01:30 )    Color: x / Appearance: x / SG: x / pH: x  Gluc: 108 mg/dL / Ketone: x  / Bili: x / Urobili: x   Blood: x / Protein: x / Nitrite: x   Leuk Esterase: x / RBC: x / WBC x   Sq Epi: x / Non Sq Epi: x / Bacteria: x                RADIOLOGY, EKG & ADDITIONAL TESTS: Reviewed.

## 2024-10-30 NOTE — DIETITIAN INITIAL EVALUATION ADULT - OTHER CALCULATIONS
Dosing weight (10/30) 226.1 lbs / 102.6 kg.  No weight history available via chart.  Ideal Body Weight: 142 lbs / 64.5 kg +/-10%

## 2024-10-30 NOTE — H&P ADULT - PROBLEM SELECTOR PLAN 1
Pt reports drinking 2 pints of grand marnier daily for the past 4 days. Initial CIWA in ED was 10. S/p librium 25 and diazepam 5 mg with improvement.   - CIWA protocol  - start ativan taper  - multivitamin, folic acid, thiamine

## 2024-10-30 NOTE — ED ADULT NURSE NOTE - CHIEF COMPLAINT QUOTE
c/o shortness of breath and dizziness tonight. also endorses blood in stool for 5 months.  No blood thinner. Hx of DMT2, afib, asthma. Ambulatory on scene.

## 2024-10-30 NOTE — DIETITIAN INITIAL EVALUATION ADULT - OTHER INFO
Per chart, pt is 59 year old male PMH EtOH misuse (hx of withdrawal), asthma, type 2 DM presenting with withdrawal. Urine positive for cocaine, opiates, methadone, fentanyl.     Pt with limited contribution to discussion, comprehensive chart review completed. NKFA, no chewing/swallowing difficulties. History of type 2 DM, no HbA1c available at this time; medications PTA per H&P inclusive of Metformin 500 mg BID. Intake notable for two pints of liquor daily x4 days, pt binges on and off.     Pt visibly consuming breakfast meal during time of visit, noted with good PO intake. Pt confirms he does not eat pork. No current GI distress, unknown last BM; no bowel regimen ordered at this time. Fingersticks WDL. Labs notable for hypokalemia, hypomagnesemia. Started on D5 IVF.

## 2024-10-30 NOTE — DIETITIAN INITIAL EVALUATION ADULT - PERTINENT LABORATORY DATA
(10/30) Na 140, BUN 8, Cr 0.65, [H], K+ 3.1[L], Phos 2.8, Mg 1.40[L], Alk Phos 89, ALT/SGPT 33, AST/SGOT 24  POCT: (10/30) , (10/29) 101-118

## 2024-10-30 NOTE — PATIENT PROFILE ADULT - FALL HARM RISK - HARM RISK INTERVENTIONS

## 2024-10-30 NOTE — H&P ADULT - PROBLEM SELECTOR PLAN 2
Pt reports sniffing heroin, last use was yesterday, previously was 3 weeks ago.   - monitor for signs of withdrawal  - f/u utox

## 2024-10-30 NOTE — H&P ADULT - NSICDXPASTMEDICALHX_GEN_ALL_CORE_FT
PAST MEDICAL HISTORY:  Asthma     DM (diabetes mellitus)     History of alcohol use disorder      PAST MEDICAL HISTORY:  Asthma     DM (diabetes mellitus)     History of alcohol use disorder     Paroxysmal atrial fibrillation

## 2024-10-30 NOTE — ED PROVIDER NOTE - PHYSICAL EXAMINATION
General: alert, oriented to person, time, place  Psych: mood appropriate  Head: normocephalic; atraumatic  Eyes: conjunctivae clear bilaterally, sclerae anicteric  ENT: no nasal flaring, patent nares  Cardio: tachycardic; no m/r/g, pulses 2+ b/l  Resp: CATB, no w/r/r  GI: soft/nondistended/nontender  Neuro: tongue fasciculations, coarse tremors bilaterally  Skin: No evidence of rash or bruising  MSK: normal movement of all extremities  Lymph/Vasc: no LE edema

## 2024-10-30 NOTE — ED ADULT TRIAGE NOTE - CHIEF COMPLAINT QUOTE
c/o shortness of breath and lightheadedness tonight. also endorses blood in stool for 5 months.  No blood thinner. Hx of DMT2, afib, asthma. Ambulatory on scene. c/o shortness of breath and dizziness tonight. also endorses blood in stool for 5 months.  No blood thinner. Hx of DMT2, afib, asthma. Ambulatory on scene.

## 2024-10-30 NOTE — ED PROVIDER NOTE - CLINICAL SUMMARY MEDICAL DECISION MAKING FREE TEXT BOX
Patient presenting in acute withdrawal evidenced by tremors, ants going on skin sensation, tongue fasciculations.  Will give patient Valium and chlordiazepoxide, check lab work, give nutritional supplementation, admit to hospital.

## 2024-10-30 NOTE — PROGRESS NOTE ADULT - PROBLEM SELECTOR PLAN 1
Pt reports drinking 2 pints of grand marnier daily for the past 4 days. Initial CIWA in ED was 10. S/p librium 25 and diazepam 5 mg with improvement.   - CIWA protocol  - start ativan taper  - multivitamin, folic acid, thiamine    Plan:  - on librium taper  - increase thiamine to 500mg TID?  - CIWA protocol

## 2024-10-30 NOTE — ED PROVIDER NOTE - PROGRESS NOTE DETAILS
Chace Magaña MD: Patient significantly improved after medication.  Lab work nonactionable apart from slight hypokalemia, oral potassium ordered.  Patient to be admitted to the hospital.

## 2024-10-30 NOTE — H&P ADULT - ASSESSMENT
Pt is a 58 y/o M w/ hx of etoh abuse/withdrawal, asthma, DM presenting with complaint of alcohol withdrawal.   Pt is a 60 y/o M w/ hx of etoh abuse/withdrawal, asthma, DM presenting with complaint of alcohol withdrawal. Pt admitted for likely etoh withdrawal.

## 2024-10-30 NOTE — DIETITIAN INITIAL EVALUATION ADULT - ADD RECOMMEND
1) Recommend consistent carbohydrate diet + Ensure Max 1 PO daily (provides 150 kcal, 30 gm protein per 11oz serving).  2) Monitor electrolytes (K+, Mg, P) and replete to within desired limits as clinically indicated.  3) Recommend continue micronutrient supplementation.

## 2024-10-30 NOTE — ED PROVIDER NOTE - ATTENDING CONTRIBUTION TO CARE
60 y/o M with h/o ETOH abuse, opiate abuse (heroin inhaled) occasionally on suboxone here with c/o not feeling well.  Pt endorsing lightheadedness, nausea, itching skin, anxiety.  He reports that approx 6 days ago he tried to stop using opiates and was taking suboxone for a few days, but stopped the suboxone because he did not want to get addicted to it too.  Pt reports for the past 5-6 days he has been drinking heavily up to 2 pints per day.  Pt has h/o previous ETOH withdrawal and seizure, but no ICU admissions.  He states he drank 2 pints of liquor today as well as used heroin prior to arrival.  No fall or injury, HA, vision changes, vomiting, cp, sob, abd pain, AVH, LOC.  No other drug use.    Pt lying in stretcher, awake and alert, appears mildly anxious, nontoxic.  Afeb, tachycardic, VSS.  (+)tongue fasciculations, dry mucosa.  Lungs cta bl.  Cards nl S1/S2, tachy reg, no MRG.  Abd soft ntnd.  No pedal edema or calf tenderness.  Mild tremulousness with extended arms, no focal neuro deficits.    Pt with likely etoh withdrawal, likely complicated with opiate withdrawal.  Will check labs, ekg, start benzos, ivfs, thiamine, admit.

## 2024-10-31 LAB
A1C WITH ESTIMATED AVERAGE GLUCOSE RESULT: 5.9 % — HIGH (ref 4–5.6)
ANION GAP SERPL CALC-SCNC: 13 MMOL/L — SIGNIFICANT CHANGE UP (ref 7–14)
BUN SERPL-MCNC: 6 MG/DL — LOW (ref 7–23)
CALCIUM SERPL-MCNC: 8.2 MG/DL — LOW (ref 8.4–10.5)
CHLORIDE SERPL-SCNC: 105 MMOL/L — SIGNIFICANT CHANGE UP (ref 98–107)
CO2 SERPL-SCNC: 25 MMOL/L — SIGNIFICANT CHANGE UP (ref 22–31)
CREAT SERPL-MCNC: 0.6 MG/DL — SIGNIFICANT CHANGE UP (ref 0.5–1.3)
EGFR: 112 ML/MIN/1.73M2 — SIGNIFICANT CHANGE UP
ESTIMATED AVERAGE GLUCOSE: 123 — SIGNIFICANT CHANGE UP
GLUCOSE SERPL-MCNC: 139 MG/DL — HIGH (ref 70–99)
HCT VFR BLD CALC: 38.2 % — LOW (ref 39–50)
HGB BLD-MCNC: 12.6 G/DL — LOW (ref 13–17)
MAGNESIUM SERPL-MCNC: 1.8 MG/DL — SIGNIFICANT CHANGE UP (ref 1.6–2.6)
MCHC RBC-ENTMCNC: 30.9 PG — SIGNIFICANT CHANGE UP (ref 27–34)
MCHC RBC-ENTMCNC: 33 G/DL — SIGNIFICANT CHANGE UP (ref 32–36)
MCV RBC AUTO: 93.6 FL — SIGNIFICANT CHANGE UP (ref 80–100)
NRBC # BLD: 0 /100 WBCS — SIGNIFICANT CHANGE UP (ref 0–0)
NRBC # FLD: 0 K/UL — SIGNIFICANT CHANGE UP (ref 0–0)
PHOSPHATE SERPL-MCNC: 2.3 MG/DL — LOW (ref 2.5–4.5)
PLATELET # BLD AUTO: 349 K/UL — SIGNIFICANT CHANGE UP (ref 150–400)
POTASSIUM SERPL-MCNC: 3.6 MMOL/L — SIGNIFICANT CHANGE UP (ref 3.5–5.3)
POTASSIUM SERPL-SCNC: 3.6 MMOL/L — SIGNIFICANT CHANGE UP (ref 3.5–5.3)
RBC # BLD: 4.08 M/UL — LOW (ref 4.2–5.8)
RBC # FLD: 14.2 % — SIGNIFICANT CHANGE UP (ref 10.3–14.5)
SODIUM SERPL-SCNC: 143 MMOL/L — SIGNIFICANT CHANGE UP (ref 135–145)
WBC # BLD: 7.51 K/UL — SIGNIFICANT CHANGE UP (ref 3.8–10.5)
WBC # FLD AUTO: 7.51 K/UL — SIGNIFICANT CHANGE UP (ref 3.8–10.5)

## 2024-10-31 PROCEDURE — 99232 SBSQ HOSP IP/OBS MODERATE 35: CPT | Mod: GC

## 2024-10-31 RX ORDER — MAGNESIUM SULFATE IN 0.9% NACL 2 G/50 ML
2 INTRAVENOUS SOLUTION, PIGGYBACK (ML) INTRAVENOUS ONCE
Refills: 0 | Status: COMPLETED | OUTPATIENT
Start: 2024-10-31 | End: 2024-10-31

## 2024-10-31 RX ORDER — DIBASIC SODIUM PHOSPHATE, MONOBASIC POTASSIUM PHOSPHATE AND MONOBASIC SODIUM PHOSPHATE 852; 155; 130 MG/1; MG/1; MG/1
1 TABLET ORAL ONCE
Refills: 0 | Status: COMPLETED | OUTPATIENT
Start: 2024-10-31 | End: 2024-10-31

## 2024-10-31 RX ORDER — SODIUM PHOSPHATE, MONOBASIC, MONOHYDRATE AND SODIUM PHOSPHATE, DIBASIC ANHYDROUS 276; 142 MG/ML; MG/ML
30 INJECTION, SOLUTION INTRAVENOUS ONCE
Refills: 0 | Status: COMPLETED | OUTPATIENT
Start: 2024-10-31 | End: 2024-10-31

## 2024-10-31 RX ADMIN — Medication 1 TABLET(S): at 11:37

## 2024-10-31 RX ADMIN — Medication 20 MILLIGRAM(S): at 21:10

## 2024-10-31 RX ADMIN — FLUTICASONE PROPIONATE AND SALMETEROL XINAFOATE 1 DOSE(S): 230; 21 AEROSOL, METERED RESPIRATORY (INHALATION) at 21:12

## 2024-10-31 RX ADMIN — FOLIC ACID 1 MILLIGRAM(S): 1 TABLET ORAL at 11:37

## 2024-10-31 RX ADMIN — CHLORHEXIDINE GLUCONATE 1 APPLICATION(S): 40 SOLUTION TOPICAL at 13:40

## 2024-10-31 RX ADMIN — Medication 1: at 17:59

## 2024-10-31 RX ADMIN — Medication 50 MILLIGRAM(S): at 21:10

## 2024-10-31 RX ADMIN — SODIUM PHOSPHATE, MONOBASIC, MONOHYDRATE AND SODIUM PHOSPHATE, DIBASIC ANHYDROUS 85 MILLIMOLE(S): 276; 142 INJECTION, SOLUTION INTRAVENOUS at 11:37

## 2024-10-31 RX ADMIN — Medication 25 GRAM(S): at 10:08

## 2024-10-31 RX ADMIN — Medication 40 MILLIGRAM(S): at 07:29

## 2024-10-31 RX ADMIN — FLUTICASONE PROPIONATE AND SALMETEROL XINAFOATE 1 DOSE(S): 230; 21 AEROSOL, METERED RESPIRATORY (INHALATION) at 08:58

## 2024-10-31 RX ADMIN — CLOPIDOGREL 75 MILLIGRAM(S): 75 TABLET ORAL at 11:38

## 2024-10-31 RX ADMIN — DIBASIC SODIUM PHOSPHATE, MONOBASIC POTASSIUM PHOSPHATE AND MONOBASIC SODIUM PHOSPHATE 1 PACKET(S): 852; 155; 130 TABLET ORAL at 10:07

## 2024-10-31 RX ADMIN — Medication 100 MILLIGRAM(S): at 11:38

## 2024-10-31 RX ADMIN — Medication 50 MILLIGRAM(S): at 13:40

## 2024-10-31 RX ADMIN — Medication 2 PUFF(S): at 08:59

## 2024-10-31 RX ADMIN — Medication 50 MILLIGRAM(S): at 00:01

## 2024-10-31 RX ADMIN — Medication 2 PUFF(S): at 21:12

## 2024-10-31 NOTE — PROGRESS NOTE ADULT - SUBJECTIVE AND OBJECTIVE BOX
****[facility] PGY-1*****    CHIEF COMPLAINT: etoh and drug withdrawal    Overnight Events: NAEON, VSS, on CIWA  No F/C, N/V, CP, SOB, Cough, lightheadedness, dizziness, abdominal pain, diarrhea, dysuria.    OBJECTIVE:  ICU Vital Signs Last 24 Hrs  T(C): 36.7 (31 Oct 2024 07:30), Max: 36.8 (30 Oct 2024 21:50)  T(F): 98 (31 Oct 2024 07:30), Max: 98.2 (30 Oct 2024 21:50)  HR: 70 (31 Oct 2024 07:30) (70 - 93)  BP: 126/87 (31 Oct 2024 07:30) (122/77 - 160/101)  BP(mean): --  ABP: --  ABP(mean): --  RR: 18 (31 Oct 2024 07:30) (17 - 18)  SpO2: 96% (31 Oct 2024 07:30) (95% - 99%)    O2 Parameters below as of 31 Oct 2024 07:30  Patient On (Oxygen Delivery Method): room air              10-30 @ 07:01  -  10-31 @ 07:00  --------------------------------------------------------  IN: 0 mL / OUT: 100 mL / NET: -100 mL      CAPILLARY BLOOD GLUCOSE      POCT Blood Glucose.: 135 mg/dL (30 Oct 2024 21:37)      PHYSICAL EXAM  General: NAD,   Head:  No bumps, bruises  Eyes: PERRL, EOMI, non-icteric sclera  Neck: trachea midline, no enlarged thyroid  Cardiac: RRR, S1/S2, no murmur  Lungs: CTA b/l, no wheezing  Abdomen: NTND, soft, BS+  Extremities: 2+ peripheral edema b/l  Neuro: able to move all 4 extremities spontaneously  Psych: AOx3, appropriate affect and mood  Skin: No noted bruises  Etc:      HOSPITAL MEDICATIONS:  MEDICATIONS  (STANDING):  atorvastatin 20 milliGRAM(s) Oral at bedtime  chlordiazePOXIDE 50 milliGRAM(s) Oral every 8 hours  chlordiazePOXIDE   Oral   chlorhexidine 2% Cloths 1 Application(s) Topical daily  clopidogrel Tablet 75 milliGRAM(s) Oral daily  dextrose 5%. 1000 milliLiter(s) (50 mL/Hr) IV Continuous <Continuous>  dextrose 50% Injectable 25 Gram(s) IV Push once  enoxaparin Injectable 40 milliGRAM(s) SubCutaneous every 24 hours  fluticasone propionate/ salmeterol 250-50 MICROgram(s) Diskus 1 Dose(s) Inhalation two times a day  folic acid 1 milliGRAM(s) Oral daily  glucagon  Injectable 1 milliGRAM(s) IntraMuscular once  insulin lispro (ADMELOG) corrective regimen sliding scale   SubCutaneous three times a day before meals  insulin lispro (ADMELOG) corrective regimen sliding scale   SubCutaneous at bedtime  montelukast 10 milliGRAM(s) Oral daily  multivitamin 1 Tablet(s) Oral daily  thiamine 100 milliGRAM(s) Oral daily    MEDICATIONS  (PRN):  acetaminophen     Tablet .. 650 milliGRAM(s) Oral every 6 hours PRN Temp greater or equal to 38C (100.4F), Mild Pain (1 - 3)  albuterol    90 MICROgram(s) HFA Inhaler 2 Puff(s) Inhalation every 6 hours PRN Shortness of Breath and/or Wheezing  dextrose Oral Gel 15 Gram(s) Oral once PRN Blood Glucose LESS THAN 70 milliGRAM(s)/deciliter      LABS:                        12.6   7.51  )-----------( 349      ( 31 Oct 2024 07:00 )             38.2     Hgb Trend: 12.6<--, 11.9<--, 13.5<--  10-30    140  |  101  |  8   ----------------------------<  129[H]  3.1[L]   |  26  |  0.65    Ca    8.0[L]      30 Oct 2024 11:12  Phos  2.8     10-30  Mg     1.40     10-30    TPro  5.7[L]  /  Alb  3.5  /  TBili  0.3  /  DBili  x   /  AST  24  /  ALT  33  /  AlkPhos  89  10-30    Creatinine Trend: 0.65<--, 0.72<--    Urinalysis Basic - ( 30 Oct 2024 11:12 )    Color: x / Appearance: x / SG: x / pH: x  Gluc: 129 mg/dL / Ketone: x  / Bili: x / Urobili: x   Blood: x / Protein: x / Nitrite: x   Leuk Esterase: x / RBC: x / WBC x   Sq Epi: x / Non Sq Epi: x / Bacteria: x            MICROBIOLOGY:       RADIOLOGY:  [ ] Reviewed by me   ****[facility] PGY-1*****    CHIEF COMPLAINT: etoh and drug withdrawal    Overnight Events: NAEON, VSS, on CIWA  No F/C, N/V, CP, SOB, Cough, lightheadedness, dizziness, abdominal pain, diarrhea, dysuria.  Has expressed potential desire to leave prior to finishing Librium taper. Understands the danger of incomplete alcohol taper including risk for seizures and death. Advised that he cannot drive while under the influence of the librium.    OBJECTIVE:  ICU Vital Signs Last 24 Hrs  T(C): 36.7 (31 Oct 2024 07:30), Max: 36.8 (30 Oct 2024 21:50)  T(F): 98 (31 Oct 2024 07:30), Max: 98.2 (30 Oct 2024 21:50)  HR: 70 (31 Oct 2024 07:30) (70 - 93)  BP: 126/87 (31 Oct 2024 07:30) (122/77 - 160/101)  BP(mean): --  ABP: --  ABP(mean): --  RR: 18 (31 Oct 2024 07:30) (17 - 18)  SpO2: 96% (31 Oct 2024 07:30) (95% - 99%)    O2 Parameters below as of 31 Oct 2024 07:30  Patient On (Oxygen Delivery Method): room air              10-30 @ 07:01  -  10-31 @ 07:00  --------------------------------------------------------  IN: 0 mL / OUT: 100 mL / NET: -100 mL      CAPILLARY BLOOD GLUCOSE      POCT Blood Glucose.: 135 mg/dL (30 Oct 2024 21:37)      PHYSICAL EXAM  General: NAD,   Head:  No bumps, bruises  Eyes: PERRL, EOMI, non-icteric sclera  Neck: trachea midline, no enlarged thyroid  Cardiac: RRR, S1/S2, no murmur  Lungs: CTA b/l, no wheezing  Abdomen: NTND, soft, BS+  Extremities: 2+ peripheral edema b/l  Neuro: able to move all 4 extremities spontaneously  Psych: AOx3, appropriate affect and mood  Skin: No noted bruises  Etc:      HOSPITAL MEDICATIONS:  MEDICATIONS  (STANDING):  atorvastatin 20 milliGRAM(s) Oral at bedtime  chlordiazePOXIDE 50 milliGRAM(s) Oral every 8 hours  chlordiazePOXIDE   Oral   chlorhexidine 2% Cloths 1 Application(s) Topical daily  clopidogrel Tablet 75 milliGRAM(s) Oral daily  dextrose 5%. 1000 milliLiter(s) (50 mL/Hr) IV Continuous <Continuous>  dextrose 50% Injectable 25 Gram(s) IV Push once  enoxaparin Injectable 40 milliGRAM(s) SubCutaneous every 24 hours  fluticasone propionate/ salmeterol 250-50 MICROgram(s) Diskus 1 Dose(s) Inhalation two times a day  folic acid 1 milliGRAM(s) Oral daily  glucagon  Injectable 1 milliGRAM(s) IntraMuscular once  insulin lispro (ADMELOG) corrective regimen sliding scale   SubCutaneous three times a day before meals  insulin lispro (ADMELOG) corrective regimen sliding scale   SubCutaneous at bedtime  montelukast 10 milliGRAM(s) Oral daily  multivitamin 1 Tablet(s) Oral daily  thiamine 100 milliGRAM(s) Oral daily    MEDICATIONS  (PRN):  acetaminophen     Tablet .. 650 milliGRAM(s) Oral every 6 hours PRN Temp greater or equal to 38C (100.4F), Mild Pain (1 - 3)  albuterol    90 MICROgram(s) HFA Inhaler 2 Puff(s) Inhalation every 6 hours PRN Shortness of Breath and/or Wheezing  dextrose Oral Gel 15 Gram(s) Oral once PRN Blood Glucose LESS THAN 70 milliGRAM(s)/deciliter      LABS:                        12.6   7.51  )-----------( 349      ( 31 Oct 2024 07:00 )             38.2     Hgb Trend: 12.6<--, 11.9<--, 13.5<--  10-30    140  |  101  |  8   ----------------------------<  129[H]  3.1[L]   |  26  |  0.65    Ca    8.0[L]      30 Oct 2024 11:12  Phos  2.8     10-30  Mg     1.40     10-30    TPro  5.7[L]  /  Alb  3.5  /  TBili  0.3  /  DBili  x   /  AST  24  /  ALT  33  /  AlkPhos  89  10-30    Creatinine Trend: 0.65<--, 0.72<--    Urinalysis Basic - ( 30 Oct 2024 11:12 )    Color: x / Appearance: x / SG: x / pH: x  Gluc: 129 mg/dL / Ketone: x  / Bili: x / Urobili: x   Blood: x / Protein: x / Nitrite: x   Leuk Esterase: x / RBC: x / WBC x   Sq Epi: x / Non Sq Epi: x / Bacteria: x            MICROBIOLOGY:       RADIOLOGY:  [ ] Reviewed by me

## 2024-10-31 NOTE — DISCHARGE NOTE PROVIDER - NSDCFUADDAPPT_GEN_ALL_CORE_FT
APPTS ARE READY TO BE MADE: [ ] YES    Best Family or Patient Contact (if needed):    Additional Information about above appointments (if needed):    1: PCP within 2-4 weeks  2:   3:     Other comments or requests:    APPTS ARE READY TO BE MADE: [ ] YES    Best Family or Patient Contact (if needed):    Additional Information about above appointments (if needed):    1: PCP within 2 weeks  2:   3:     Other comments or requests:

## 2024-10-31 NOTE — DISCHARGE NOTE PROVIDER - NSDCCPCAREPLAN_GEN_ALL_CORE_FT
PRINCIPAL DISCHARGE DIAGNOSIS  Diagnosis: Alcohol dependence with withdrawal  Assessment and Plan of Treatment: You came into the hospital for alcohol withdrawal and wanted to get off alcohol. We placed you on a Librium taper while in the hospital. The goal of this therapy is to help you get through the withdrawal period of the alcohol. While in the hospital you were feeling itching and had tremors. These are signs of your alcohol withdrawal. You also told us that you had taken a few other drugs which were shown in your urine.   Please continue to monitor your self for symptoms of alcohol withdrawal. Please try to reduce your drinking or refrain from drinking entirely. Please return to the ED if you notice concerning symptoms.     PRINCIPAL DISCHARGE DIAGNOSIS  Diagnosis: Alcohol dependence with withdrawal  Assessment and Plan of Treatment: You came into the hospital for alcohol withdrawal and wanted to reduce your alcohol intake. We placed you on a librium taper while in the hospital. The goal of this therapy is to help you get through the withdrawal period of the alcohol. While in the hospital you were feeling itching and had tremors. These are signs of your alcohol withdrawal. We continued you on the libirum until you were out of the window for withdrawal symptoms. You also told us that you had taken a few other drugs which were shown in your urine but did not show any clinical signs of withdrawal from those drugs.  Please continue to monitor your self for symptoms of alcohol withdrawal. Please make sure to reach out to support groups to help with managing your alcohol intake. Please return to the ED if you notice concerning symptoms or if you abruptly stop drinking. Withdrawal from alcohol can show dangerous symptoms such as seizure or difficulty breathing and we want to make sure you are able to decrease your drinking safely.

## 2024-10-31 NOTE — DISCHARGE NOTE PROVIDER - NSDCMRMEDTOKEN_GEN_ALL_CORE_FT
Albuterol (Eqv-ProAir HFA) 90 mcg/inh inhalation aerosol: 2 inhaled every 6 hours as needed for  bronchospasm  atorvastatin 20 mg oral tablet: 1 tab(s) orally once a day  Breztri Aerosphere 160 mcg-9 mcg-4.8 mcg/inh inhalation aerosol: 2 puff(s) inhaled  metFORMIN 500 mg oral tablet: 1 tab(s) orally 2 times a day  montelukast 10 mg oral tablet: 1 tab(s) orally once a day  Plavix 75 mg oral tablet: 1 tab(s) orally once a day

## 2024-10-31 NOTE — PROGRESS NOTE ADULT - PROBLEM SELECTOR PLAN 2
Pt reports sniffing heroin, last use was yesterday, previously was 3 weeks ago.     He reports that approx 6 days ago he tried to stop using opiates and was taking suboxone for a few days, but stopped the suboxone because he did not want to get addicted to it too.    Plan:  - monitor for signs of withdrawal  - f/u utox  ->> positive for cocaine, opiates, methadone, fentanyl

## 2024-11-01 LAB
ALBUMIN SERPL ELPH-MCNC: 3.3 G/DL — SIGNIFICANT CHANGE UP (ref 3.3–5)
ALP SERPL-CCNC: 83 U/L — SIGNIFICANT CHANGE UP (ref 40–120)
ALT FLD-CCNC: 28 U/L — SIGNIFICANT CHANGE UP (ref 4–41)
ANION GAP SERPL CALC-SCNC: 11 MMOL/L — SIGNIFICANT CHANGE UP (ref 7–14)
AST SERPL-CCNC: 19 U/L — SIGNIFICANT CHANGE UP (ref 4–40)
BASOPHILS # BLD AUTO: 0.06 K/UL — SIGNIFICANT CHANGE UP (ref 0–0.2)
BASOPHILS NFR BLD AUTO: 0.7 % — SIGNIFICANT CHANGE UP (ref 0–2)
BILIRUB SERPL-MCNC: <0.2 MG/DL — SIGNIFICANT CHANGE UP (ref 0.2–1.2)
BUN SERPL-MCNC: 8 MG/DL — SIGNIFICANT CHANGE UP (ref 7–23)
CALCIUM SERPL-MCNC: 8.3 MG/DL — LOW (ref 8.4–10.5)
CHLORIDE SERPL-SCNC: 107 MMOL/L — SIGNIFICANT CHANGE UP (ref 98–107)
CO2 SERPL-SCNC: 26 MMOL/L — SIGNIFICANT CHANGE UP (ref 22–31)
CREAT SERPL-MCNC: 0.66 MG/DL — SIGNIFICANT CHANGE UP (ref 0.5–1.3)
EGFR: 109 ML/MIN/1.73M2 — SIGNIFICANT CHANGE UP
EOSINOPHIL # BLD AUTO: 0.4 K/UL — SIGNIFICANT CHANGE UP (ref 0–0.5)
EOSINOPHIL NFR BLD AUTO: 4.8 % — SIGNIFICANT CHANGE UP (ref 0–6)
GLUCOSE SERPL-MCNC: 89 MG/DL — SIGNIFICANT CHANGE UP (ref 70–99)
HCT VFR BLD CALC: 39.2 % — SIGNIFICANT CHANGE UP (ref 39–50)
HGB BLD-MCNC: 13 G/DL — SIGNIFICANT CHANGE UP (ref 13–17)
IANC: 5.3 K/UL — SIGNIFICANT CHANGE UP (ref 1.8–7.4)
IMM GRANULOCYTES NFR BLD AUTO: 0.4 % — SIGNIFICANT CHANGE UP (ref 0–0.9)
LYMPHOCYTES # BLD AUTO: 1.73 K/UL — SIGNIFICANT CHANGE UP (ref 1–3.3)
LYMPHOCYTES # BLD AUTO: 20.7 % — SIGNIFICANT CHANGE UP (ref 13–44)
MAGNESIUM SERPL-MCNC: 2 MG/DL — SIGNIFICANT CHANGE UP (ref 1.6–2.6)
MCHC RBC-ENTMCNC: 31 PG — SIGNIFICANT CHANGE UP (ref 27–34)
MCHC RBC-ENTMCNC: 33.2 G/DL — SIGNIFICANT CHANGE UP (ref 32–36)
MCV RBC AUTO: 93.6 FL — SIGNIFICANT CHANGE UP (ref 80–100)
MONOCYTES # BLD AUTO: 0.82 K/UL — SIGNIFICANT CHANGE UP (ref 0–0.9)
MONOCYTES NFR BLD AUTO: 9.8 % — SIGNIFICANT CHANGE UP (ref 2–14)
NEUTROPHILS # BLD AUTO: 5.3 K/UL — SIGNIFICANT CHANGE UP (ref 1.8–7.4)
NEUTROPHILS NFR BLD AUTO: 63.6 % — SIGNIFICANT CHANGE UP (ref 43–77)
NRBC # BLD: 0 /100 WBCS — SIGNIFICANT CHANGE UP (ref 0–0)
NRBC # FLD: 0 K/UL — SIGNIFICANT CHANGE UP (ref 0–0)
PHOSPHATE SERPL-MCNC: 3.4 MG/DL — SIGNIFICANT CHANGE UP (ref 2.5–4.5)
PLATELET # BLD AUTO: 336 K/UL — SIGNIFICANT CHANGE UP (ref 150–400)
POTASSIUM SERPL-MCNC: 4.2 MMOL/L — SIGNIFICANT CHANGE UP (ref 3.5–5.3)
POTASSIUM SERPL-SCNC: 4.2 MMOL/L — SIGNIFICANT CHANGE UP (ref 3.5–5.3)
PROT SERPL-MCNC: 5.6 G/DL — LOW (ref 6–8.3)
RBC # BLD: 4.19 M/UL — LOW (ref 4.2–5.8)
RBC # FLD: 14.2 % — SIGNIFICANT CHANGE UP (ref 10.3–14.5)
SODIUM SERPL-SCNC: 144 MMOL/L — SIGNIFICANT CHANGE UP (ref 135–145)
WBC # BLD: 8.34 K/UL — SIGNIFICANT CHANGE UP (ref 3.8–10.5)
WBC # FLD AUTO: 8.34 K/UL — SIGNIFICANT CHANGE UP (ref 3.8–10.5)

## 2024-11-01 PROCEDURE — 99232 SBSQ HOSP IP/OBS MODERATE 35: CPT | Mod: GC

## 2024-11-01 RX ORDER — DIPHENHYDRAMINE HCL 12.5MG/5ML
25 ELIXIR ORAL ONCE
Refills: 0 | Status: COMPLETED | OUTPATIENT
Start: 2024-11-01 | End: 2024-11-01

## 2024-11-01 RX ADMIN — Medication 50 MILLIGRAM(S): at 17:33

## 2024-11-01 RX ADMIN — Medication 40 MILLIGRAM(S): at 06:41

## 2024-11-01 RX ADMIN — Medication 25 MILLIGRAM(S): at 21:01

## 2024-11-01 RX ADMIN — CLOPIDOGREL 75 MILLIGRAM(S): 75 TABLET ORAL at 11:54

## 2024-11-01 RX ADMIN — Medication 1 TABLET(S): at 11:53

## 2024-11-01 RX ADMIN — Medication 100 MILLIGRAM(S): at 11:54

## 2024-11-01 RX ADMIN — Medication 50 MILLIGRAM(S): at 06:40

## 2024-11-01 RX ADMIN — FOLIC ACID 1 MILLIGRAM(S): 1 TABLET ORAL at 11:53

## 2024-11-01 RX ADMIN — Medication 20 MILLIGRAM(S): at 21:01

## 2024-11-01 RX ADMIN — CHLORHEXIDINE GLUCONATE 1 APPLICATION(S): 40 SOLUTION TOPICAL at 13:44

## 2024-11-01 RX ADMIN — MONTELUKAST SODIUM 10 MILLIGRAM(S): 10 TABLET, FILM COATED ORAL at 11:53

## 2024-11-01 NOTE — PROGRESS NOTE ADULT - ATTENDING COMMENTS
59M DM2, PAF, Asthma, polysubstance abuse (cocaine, opiates, methadone, fentanyl), EtOH abuse w/ hx of w/d, p/w EtOH w/d with tactile hallucination, hypokalemia.    - EtOH w/d c/b hallucination - librium standing taper; continue thiamine, folic acid; monitor CIWA  - polysubstance abuse - has been on methadone in the past but with poor follow up; will defer methadone at this time as patient has known dependence on fentanyl and heroin  - Hypokalemia - replete lytes  - Asthma - no acute exacerbation; continue advair and singulair  - PAF - EKG sinus tachycardia; continue plavix
59M DM2, PAF, Asthma, polysubstance abuse (cocaine, opiates, methadone, fentanyl), EtOH abuse w/ hx of w/d, p/w EtOH w/d with tactile hallucination, hypokalemia.    - EtOH w/d c/b hallucination - librium standing taper - to end on 11/2; continue thiamine, folic acid; monitor CIWA  - polysubstance abuse - has been on methadone in the past but with poor follow up; will defer methadone at this time as patient has known dependence on fentanyl and heroin  - Hypokalemia - replete lytes  - Asthma - no acute exacerbation; continue advair and singulair  - PAF - EKG sinus tachycardia; continue plavix
59M DM2, PAF, Asthma, polysubstance abuse (cocaine, opiates, methadone, fentanyl), EtOH abuse w/ hx of w/d, p/w EtOH w/d with tactile hallucination, hypokalemia.    - EtOH w/d c/b hallucination - librium standing taper; continue thiamine, folic acid; monitor CIWA  - polysubstance abuse - has been on methadone in the past but with poor follow up; will defer methadone at this time as patient has known dependence on fentanyl and heroin  - Hypokalemia - replete lytes  - Asthma - no acute exacerbation; continue advair and singulair  - PAF - EKG sinus tachycardia; continue plavix

## 2024-11-01 NOTE — PROGRESS NOTE ADULT - PROBLEM SELECTOR PLAN 1
Pt reports drinking 2 pints of [place]arn[place] daily for the past 4 days. Initial CIWA in ED was 10. S/p librium 25 and diazepam 5 mg with improvement.   - CIWA protocol  - start ativan taper  - multivitamin, folic acid, thiamine    Plan:  - on librium taper  - CIWA protocol Pt reports drinking 2 pints of [place] daily for the past 4 days. Initial CIWA in ED was 10. S/p librium 25 and diazepam 5 mg with improvement.   - CIWA protocol  - start ativan taper  - multivitamin, folic acid, thiamine    Plan:  - on librium taper (10/30-11/3)  - CIWA protocol

## 2024-11-01 NOTE — PROGRESS NOTE ADULT - PROBLEM SELECTOR PLAN 6
DVT prophylaxis: lovnenox  Diet: dash/tlc, cc, no pork
DVT prophylaxis: lovnenox  Diet: dash/tlc, cc, no pork
DVT prophylaxis: lovenox  Diet: dash/tlc, cc, no pork

## 2024-11-01 NOTE — PROGRESS NOTE ADULT - ASSESSMENT
Pt is a 60 y/o M w/ hx of etoh abuse/withdrawal, asthma, DM presenting with complaint of alcohol withdrawal. Pt admitted for likely etoh withdrawal found to have multiple other drugs in urine tox being monitored for additional withdrawal symptoms.  
Pt is a 58 y/o M w/ hx of etoh abuse/withdrawal, asthma, DM presenting with complaint of alcohol withdrawal. Pt admitted for likely etoh withdrawal found to have multiple other drugs in urine tox being monitored for withdrawal.   
Pt is a 58 y/o M w/ hx of etoh abuse/withdrawal, asthma, DM presenting with complaint of alcohol withdrawal. Pt admitted for likely etoh withdrawal found to have multiple other drugs in urine tox being monitored for withdrawal.

## 2024-11-01 NOTE — PROGRESS NOTE ADULT - PROBLEM SELECTOR PLAN 3
Plan:  - montelukast qd  - Advair 250/50 BID  - albuterol PRN q6h

## 2024-11-01 NOTE — PROGRESS NOTE ADULT - PROBLEM SELECTOR PLAN 2
Pt reports sniffing heroin, last use was yesterday, previously was 3 weeks ago.     He reports that approx 6 days ago he tried to stop using opiates and was taking suboxone for a few days, but stopped the suboxone because he did not want to get addicted to it too.    Plan:  - monitor for signs of withdrawal  - f/u utox  ->> positive for cocaine, opiates, methadone, fentanyl Pt reports sniffing heroin, cocaine and fentanyl, last use was yesterday, previously was 3 weeks ago.     He reports that approx 6 days ago he tried to stop using opiates and was taking suboxone for a few days, but stopped the suboxone because he did not want to get addicted to it too.    Plan:  - monitor for signs of withdrawal  - f/u utox  ->> positive for cocaine, opiates, methadone, fentanyl

## 2024-11-01 NOTE — PROGRESS NOTE ADULT - PROBLEM SELECTOR PLAN 4
Pt on home metformin however mostly noncompliant.  - start low dose ISS  - f/u A1C

## 2024-11-01 NOTE — PROGRESS NOTE ADULT - SUBJECTIVE AND OBJECTIVE BOX
****Duane Fabiana Internal Medicine PGY-1*****    CHIEF COMPLAINT: alcohol withdrawal    Overnight Events: NAEON, VSS  Interval Events: doing well this morning although would like to leave    OBJECTIVE:  ICU Vital Signs Last 24 Hrs  T(C): 36.8 (01 Nov 2024 06:00), Max: 37.1 (31 Oct 2024 22:29)  T(F): 98.3 (01 Nov 2024 06:00), Max: 98.8 (31 Oct 2024 22:29)  HR: 75 (01 Nov 2024 06:00) (74 - 92)  BP: 130/85 (01 Nov 2024 06:00) (129/74 - 160/90)  BP(mean): --  ABP: --  ABP(mean): --  RR: 17 (01 Nov 2024 06:00) (17 - 18)  SpO2: 97% (01 Nov 2024 06:00) (95% - 100%)    O2 Parameters below as of 01 Nov 2024 06:00  Patient On (Oxygen Delivery Method): room air              10-31 @ 07:01  -  11-01 @ 07:00  --------------------------------------------------------  IN: 850 mL / OUT: 0 mL / NET: 850 mL      CAPILLARY BLOOD GLUCOSE      POCT Blood Glucose.: 161 mg/dL (31 Oct 2024 21:09)      PHYSICAL EXAM  General: NAD,   Head:  No bumps, bruises  Eyes: PERRL, EOMI, non-icteric sclera  Neck: trachea midline, no enlarged thyroid  Cardiac: RRR, S1/S2, no murmur  Lungs: CTA b/l, no wheezing  Abdomen: NTND, soft, BS+  Extremities: 2+ peripheral edema b/l  Neuro: able to move all 4 extremities spontaneously  Psych: AOx3, appropriate affect and mood  Skin: No noted bruises  Etc:      HOSPITAL MEDICATIONS:  MEDICATIONS  (STANDING):  atorvastatin 20 milliGRAM(s) Oral at bedtime  chlordiazePOXIDE 50 milliGRAM(s) Oral every 12 hours  chlordiazePOXIDE   Oral   chlorhexidine 2% Cloths 1 Application(s) Topical daily  clopidogrel Tablet 75 milliGRAM(s) Oral daily  dextrose 5%. 1000 milliLiter(s) (50 mL/Hr) IV Continuous <Continuous>  dextrose 50% Injectable 25 Gram(s) IV Push once  enoxaparin Injectable 40 milliGRAM(s) SubCutaneous every 24 hours  fluticasone propionate/ salmeterol 250-50 MICROgram(s) Diskus 1 Dose(s) Inhalation two times a day  folic acid 1 milliGRAM(s) Oral daily  glucagon  Injectable 1 milliGRAM(s) IntraMuscular once  insulin lispro (ADMELOG) corrective regimen sliding scale   SubCutaneous three times a day before meals  insulin lispro (ADMELOG) corrective regimen sliding scale   SubCutaneous at bedtime  montelukast 10 milliGRAM(s) Oral daily  multivitamin 1 Tablet(s) Oral daily  thiamine 100 milliGRAM(s) Oral daily    MEDICATIONS  (PRN):  acetaminophen     Tablet .. 650 milliGRAM(s) Oral every 6 hours PRN Temp greater or equal to 38C (100.4F), Mild Pain (1 - 3)  albuterol    90 MICROgram(s) HFA Inhaler 2 Puff(s) Inhalation every 6 hours PRN Shortness of Breath and/or Wheezing  dextrose Oral Gel 15 Gram(s) Oral once PRN Blood Glucose LESS THAN 70 milliGRAM(s)/deciliter      LABS:                        13.0   8.34  )-----------( 336      ( 01 Nov 2024 05:45 )             39.2     Hgb Trend: 13.0<--, 12.6<--, 11.9<--, 13.5<--  11-01    144  |  107  |  8   ----------------------------<  89  4.2   |  26  |  0.66    Ca    8.3[L]      01 Nov 2024 05:45  Phos  3.4     11-01  Mg     2.00     11-01    TPro  5.6[L]  /  Alb  3.3  /  TBili  <0.2  /  DBili  x   /  AST  19  /  ALT  28  /  AlkPhos  83  11-01    Creatinine Trend: 0.66<--, 0.60<--, 0.65<--, 0.72<--    Urinalysis Basic - ( 01 Nov 2024 05:45 )    Color: x / Appearance: x / SG: x / pH: x  Gluc: 89 mg/dL / Ketone: x  / Bili: x / Urobili: x   Blood: x / Protein: x / Nitrite: x   Leuk Esterase: x / RBC: x / WBC x   Sq Epi: x / Non Sq Epi: x / Bacteria: x            MICROBIOLOGY:       RADIOLOGY:  [ ] Reviewed by me   ****Duane Fabiana Internal Medicine PGY-1*****    CHIEF COMPLAINT: alcohol withdrawal    Overnight Events: NAEON, VSS  Interval Events: doing well this morning, interested in staying currently to complete his taper    OBJECTIVE:  ICU Vital Signs Last 24 Hrs  T(C): 36.8 (01 Nov 2024 06:00), Max: 37.1 (31 Oct 2024 22:29)  T(F): 98.3 (01 Nov 2024 06:00), Max: 98.8 (31 Oct 2024 22:29)  HR: 75 (01 Nov 2024 06:00) (74 - 92)  BP: 130/85 (01 Nov 2024 06:00) (129/74 - 160/90)  BP(mean): --  ABP: --  ABP(mean): --  RR: 17 (01 Nov 2024 06:00) (17 - 18)  SpO2: 97% (01 Nov 2024 06:00) (95% - 100%)    O2 Parameters below as of 01 Nov 2024 06:00  Patient On (Oxygen Delivery Method): room air              10-31 @ 07:01  -  11-01 @ 07:00  --------------------------------------------------------  IN: 850 mL / OUT: 0 mL / NET: 850 mL      CAPILLARY BLOOD GLUCOSE      POCT Blood Glucose.: 161 mg/dL (31 Oct 2024 21:09)      PHYSICAL EXAM  General: NAD, seen while walking around room and eating breakfast  Head:  No bumps, bruises  Eyes: PERRL, EOMI, non-icteric sclera  Neck: trachea midline, no enlarged thyroid  Cardiac: RRR, S1/S2, no murmur  Lungs: CTA b/l, no wheezing  Abdomen: NTND, soft, BS+  Extremities: 2+ peripheral edema b/l but improved compared to prior  Neuro: able to move all 4 extremities spontaneously, walking with normal gait  Psych: AOx3, appropriate affect and mood, communicative   Skin: No noted bruises  Etc:      HOSPITAL MEDICATIONS:  MEDICATIONS  (STANDING):  atorvastatin 20 milliGRAM(s) Oral at bedtime  chlordiazePOXIDE 50 milliGRAM(s) Oral every 12 hours  chlordiazePOXIDE   Oral   chlorhexidine 2% Cloths 1 Application(s) Topical daily  clopidogrel Tablet 75 milliGRAM(s) Oral daily  dextrose 5%. 1000 milliLiter(s) (50 mL/Hr) IV Continuous <Continuous>  dextrose 50% Injectable 25 Gram(s) IV Push once  enoxaparin Injectable 40 milliGRAM(s) SubCutaneous every 24 hours  fluticasone propionate/ salmeterol 250-50 MICROgram(s) Diskus 1 Dose(s) Inhalation two times a day  folic acid 1 milliGRAM(s) Oral daily  glucagon  Injectable 1 milliGRAM(s) IntraMuscular once  insulin lispro (ADMELOG) corrective regimen sliding scale   SubCutaneous three times a day before meals  insulin lispro (ADMELOG) corrective regimen sliding scale   SubCutaneous at bedtime  montelukast 10 milliGRAM(s) Oral daily  multivitamin 1 Tablet(s) Oral daily  thiamine 100 milliGRAM(s) Oral daily    MEDICATIONS  (PRN):  acetaminophen     Tablet .. 650 milliGRAM(s) Oral every 6 hours PRN Temp greater or equal to 38C (100.4F), Mild Pain (1 - 3)  albuterol    90 MICROgram(s) HFA Inhaler 2 Puff(s) Inhalation every 6 hours PRN Shortness of Breath and/or Wheezing  dextrose Oral Gel 15 Gram(s) Oral once PRN Blood Glucose LESS THAN 70 milliGRAM(s)/deciliter      LABS:                        13.0   8.34  )-----------( 336      ( 01 Nov 2024 05:45 )             39.2     Hgb Trend: 13.0<--, 12.6<--, 11.9<--, 13.5<--  11-01    144  |  107  |  8   ----------------------------<  89  4.2   |  26  |  0.66    Ca    8.3[L]      01 Nov 2024 05:45  Phos  3.4     11-01  Mg     2.00     11-01    TPro  5.6[L]  /  Alb  3.3  /  TBili  <0.2  /  DBili  x   /  AST  19  /  ALT  28  /  AlkPhos  83  11-01    Creatinine Trend: 0.66<--, 0.60<--, 0.65<--, 0.72<--    Urinalysis Basic - ( 01 Nov 2024 05:45 )    Color: x / Appearance: x / SG: x / pH: x  Gluc: 89 mg/dL / Ketone: x  / Bili: x / Urobili: x   Blood: x / Protein: x / Nitrite: x   Leuk Esterase: x / RBC: x / WBC x   Sq Epi: x / Non Sq Epi: x / Bacteria: x            MICROBIOLOGY:       RADIOLOGY:  [ ] Reviewed by me   ****Duane Jung Internal Medicine PGY-1*****    CHIEF COMPLAINT: alcohol withdrawal    Overnight Events: NAEON, VSS  Interval Events: doing well this morning, interested in staying currently to complete his taper  No F/C, N/V, CP, SOB, Cough, lightheadedness, dizziness, abdominal pain, diarrhea, dysuria.      OBJECTIVE:  ICU Vital Signs Last 24 Hrs  T(C): 36.8 (01 Nov 2024 06:00), Max: 37.1 (31 Oct 2024 22:29)  T(F): 98.3 (01 Nov 2024 06:00), Max: 98.8 (31 Oct 2024 22:29)  HR: 75 (01 Nov 2024 06:00) (74 - 92)  BP: 130/85 (01 Nov 2024 06:00) (129/74 - 160/90)  BP(mean): --  ABP: --  ABP(mean): --  RR: 17 (01 Nov 2024 06:00) (17 - 18)  SpO2: 97% (01 Nov 2024 06:00) (95% - 100%)    O2 Parameters below as of 01 Nov 2024 06:00  Patient On (Oxygen Delivery Method): room air              10-31 @ 07:01  -  11-01 @ 07:00  --------------------------------------------------------  IN: 850 mL / OUT: 0 mL / NET: 850 mL      CAPILLARY BLOOD GLUCOSE      POCT Blood Glucose.: 161 mg/dL (31 Oct 2024 21:09)      PHYSICAL EXAM  General: NAD, seen while walking around room and eating breakfast  Head:  No bumps, bruises  Eyes: PERRL, EOMI, non-icteric sclera  Neck: trachea midline, no enlarged thyroid  Cardiac: RRR, S1/S2, no murmur  Lungs: CTA b/l, no wheezing  Abdomen: NTND, soft, BS+  Extremities: 2+ peripheral edema b/l but improved compared to prior  Neuro: able to move all 4 extremities spontaneously, walking with normal gait  Psych: AOx3, appropriate affect and mood, communicative   Skin: No noted bruises  Etc:      HOSPITAL MEDICATIONS:  MEDICATIONS  (STANDING):  atorvastatin 20 milliGRAM(s) Oral at bedtime  chlordiazePOXIDE 50 milliGRAM(s) Oral every 12 hours  chlordiazePOXIDE   Oral   chlorhexidine 2% Cloths 1 Application(s) Topical daily  clopidogrel Tablet 75 milliGRAM(s) Oral daily  dextrose 5%. 1000 milliLiter(s) (50 mL/Hr) IV Continuous <Continuous>  dextrose 50% Injectable 25 Gram(s) IV Push once  enoxaparin Injectable 40 milliGRAM(s) SubCutaneous every 24 hours  fluticasone propionate/ salmeterol 250-50 MICROgram(s) Diskus 1 Dose(s) Inhalation two times a day  folic acid 1 milliGRAM(s) Oral daily  glucagon  Injectable 1 milliGRAM(s) IntraMuscular once  insulin lispro (ADMELOG) corrective regimen sliding scale   SubCutaneous three times a day before meals  insulin lispro (ADMELOG) corrective regimen sliding scale   SubCutaneous at bedtime  montelukast 10 milliGRAM(s) Oral daily  multivitamin 1 Tablet(s) Oral daily  thiamine 100 milliGRAM(s) Oral daily    MEDICATIONS  (PRN):  acetaminophen     Tablet .. 650 milliGRAM(s) Oral every 6 hours PRN Temp greater or equal to 38C (100.4F), Mild Pain (1 - 3)  albuterol    90 MICROgram(s) HFA Inhaler 2 Puff(s) Inhalation every 6 hours PRN Shortness of Breath and/or Wheezing  dextrose Oral Gel 15 Gram(s) Oral once PRN Blood Glucose LESS THAN 70 milliGRAM(s)/deciliter      LABS:                        13.0   8.34  )-----------( 336      ( 01 Nov 2024 05:45 )             39.2     Hgb Trend: 13.0<--, 12.6<--, 11.9<--, 13.5<--  11-01    144  |  107  |  8   ----------------------------<  89  4.2   |  26  |  0.66    Ca    8.3[L]      01 Nov 2024 05:45  Phos  3.4     11-01  Mg     2.00     11-01    TPro  5.6[L]  /  Alb  3.3  /  TBili  <0.2  /  DBili  x   /  AST  19  /  ALT  28  /  AlkPhos  83  11-01    Creatinine Trend: 0.66<--, 0.60<--, 0.65<--, 0.72<--    Urinalysis Basic - ( 01 Nov 2024 05:45 )    Color: x / Appearance: x / SG: x / pH: x  Gluc: 89 mg/dL / Ketone: x  / Bili: x / Urobili: x   Blood: x / Protein: x / Nitrite: x   Leuk Esterase: x / RBC: x / WBC x   Sq Epi: x / Non Sq Epi: x / Bacteria: x            MICROBIOLOGY:       RADIOLOGY:  [ ] Reviewed by me

## 2024-11-01 NOTE — PROGRESS NOTE ADULT - PROBLEM SELECTOR PLAN 5
[id number] score 1. Pt states previously on AC however now just on plavix  - can continue plavix for now
[id number] score 1. Pt states previously on AC however now just on plavix  - can continue plavix for now
[id number]-Doaw score 1. Pt states previously on AC however now just on plavix  - can continue plavix for now

## 2024-11-02 VITALS
TEMPERATURE: 98 F | DIASTOLIC BLOOD PRESSURE: 87 MMHG | RESPIRATION RATE: 18 BRPM | OXYGEN SATURATION: 97 % | HEART RATE: 72 BPM | SYSTOLIC BLOOD PRESSURE: 131 MMHG

## 2024-11-02 RX ORDER — HYDROXYZINE HCL 25 MG
25 TABLET ORAL EVERY 6 HOURS
Refills: 0 | Status: DISCONTINUED | OUTPATIENT
Start: 2024-11-02 | End: 2024-11-02

## 2024-11-02 NOTE — CHART NOTE - NSCHARTNOTEFT_GEN_A_CORE
Called by RN, as patient wishes to leave AMA. Patient seen at bedside to further discuss plan of care. Discussed risks of leaving against medical advice, which includes worsening of current medical condition, up to and including death. Patient understands risks and still wishes to leave. AMA paperwork signed and placed in chart. Weekend attending will be made aware.     Will continue to follow, RN to call if any changes.
This report was requested by: Dameon Gamino | Reference #: [id number]    Practitioner Count: 1  Pharmacy Count: 1  Current Opioid Prescriptions: 0  Current Benzodiazepine Prescriptions: 0  Current Stimulant Prescriptions: 0      Patient Demographic Information (PDI)       PDI	First Name	Last Name	Birth Date	Gender	Street Address	[facility] Code  TORSTEN Apple	1965	Male	[address] [place]	[address]    Prescription Information      PDI Filter:    PDI	Current Rx	Drug Type	Rx Written	Rx Dispensed	Drug	Quantity	Days Supply	Prescriber Name	Prescriber VELMA #	Payment Method	Dispenser  A	N	O	09/04/2024	09/11/2024	buprenorphine-naloxone 8-2 mg sl film	21	7	Concepción Reddy	[id number]	[facility]  A	N	O	07/31/2024	07/31/2024	buprenorphine-naloxone 8-2 mg sl film	90	30	Kathy Mcclain [id number]	Insurance	[employer]  A	N		03/29/2024	05/04/2024	lacosamide 100 mg tablet	60	30	Giselle Corona	[id number]	Insurance	[employer]  A	N		02/28/2024	02/29/2024	lacosamide 100 mg tablet	60	30	Aileen Prieto	[id number]	[facility]

## 2024-11-11 ENCOUNTER — EMERGENCY (EMERGENCY)
Facility: HOSPITAL | Age: 59
LOS: 1 days | Discharge: HOME | End: 2024-11-11
Attending: EMERGENCY MEDICINE | Admitting: EMERGENCY MEDICINE
Payer: MEDICARE

## 2024-11-11 VITALS
WEIGHT: 210.1 LBS | DIASTOLIC BLOOD PRESSURE: 72 MMHG | RESPIRATION RATE: 18 BRPM | HEIGHT: 66 IN | SYSTOLIC BLOOD PRESSURE: 154 MMHG | TEMPERATURE: 98 F | HEART RATE: 85 BPM | OXYGEN SATURATION: 94 %

## 2024-11-11 VITALS
SYSTOLIC BLOOD PRESSURE: 130 MMHG | RESPIRATION RATE: 16 BRPM | HEART RATE: 86 BPM | OXYGEN SATURATION: 96 % | TEMPERATURE: 98 F | DIASTOLIC BLOOD PRESSURE: 87 MMHG

## 2024-11-11 DIAGNOSIS — Z98.890 OTHER SPECIFIED POSTPROCEDURAL STATES: Chronic | ICD-10-CM

## 2024-11-11 PROBLEM — I48.0 PAROXYSMAL ATRIAL FIBRILLATION: Chronic | Status: ACTIVE | Noted: 2024-10-30

## 2024-11-11 PROBLEM — E11.9 TYPE 2 DIABETES MELLITUS WITHOUT COMPLICATIONS: Chronic | Status: ACTIVE | Noted: 2024-10-30

## 2024-11-11 PROBLEM — Z87.898 PERSONAL HISTORY OF OTHER SPECIFIED CONDITIONS: Chronic | Status: ACTIVE | Noted: 2024-10-30

## 2024-11-11 PROBLEM — J45.909 UNSPECIFIED ASTHMA, UNCOMPLICATED: Chronic | Status: ACTIVE | Noted: 2024-10-30

## 2024-11-11 LAB
ALBUMIN SERPL ELPH-MCNC: 3.5 G/DL — SIGNIFICANT CHANGE UP (ref 3.3–5)
ALP SERPL-CCNC: 84 U/L — SIGNIFICANT CHANGE UP (ref 40–120)
ALT FLD-CCNC: 41 U/L — SIGNIFICANT CHANGE UP (ref 4–41)
ANION GAP SERPL CALC-SCNC: 16 MMOL/L — HIGH (ref 7–14)
AST SERPL-CCNC: 65 U/L — HIGH (ref 4–40)
BASOPHILS # BLD AUTO: 0.06 K/UL — SIGNIFICANT CHANGE UP (ref 0–0.2)
BASOPHILS NFR BLD AUTO: 0.6 % — SIGNIFICANT CHANGE UP (ref 0–2)
BILIRUB SERPL-MCNC: 0.3 MG/DL — SIGNIFICANT CHANGE UP (ref 0.2–1.2)
BLOOD GAS VENOUS COMPREHENSIVE RESULT: SIGNIFICANT CHANGE UP
BUN SERPL-MCNC: 8 MG/DL — SIGNIFICANT CHANGE UP (ref 7–23)
CALCIUM SERPL-MCNC: 8.1 MG/DL — LOW (ref 8.4–10.5)
CHLORIDE SERPL-SCNC: 101 MMOL/L — SIGNIFICANT CHANGE UP (ref 98–107)
CO2 SERPL-SCNC: 23 MMOL/L — SIGNIFICANT CHANGE UP (ref 22–31)
CREAT SERPL-MCNC: 0.74 MG/DL — SIGNIFICANT CHANGE UP (ref 0.5–1.3)
EGFR: 105 ML/MIN/1.73M2 — SIGNIFICANT CHANGE UP
EOSINOPHIL # BLD AUTO: 0.49 K/UL — SIGNIFICANT CHANGE UP (ref 0–0.5)
EOSINOPHIL NFR BLD AUTO: 4.6 % — SIGNIFICANT CHANGE UP (ref 0–6)
FLUAV AG NPH QL: SIGNIFICANT CHANGE UP
FLUBV AG NPH QL: SIGNIFICANT CHANGE UP
GLUCOSE SERPL-MCNC: 82 MG/DL — SIGNIFICANT CHANGE UP (ref 70–99)
HCT VFR BLD CALC: 36.9 % — LOW (ref 39–50)
HGB BLD-MCNC: 12.1 G/DL — LOW (ref 13–17)
IANC: 7.39 K/UL — SIGNIFICANT CHANGE UP (ref 1.8–7.4)
IMM GRANULOCYTES NFR BLD AUTO: 0.3 % — SIGNIFICANT CHANGE UP (ref 0–0.9)
LYMPHOCYTES # BLD AUTO: 1.94 K/UL — SIGNIFICANT CHANGE UP (ref 1–3.3)
LYMPHOCYTES # BLD AUTO: 18.3 % — SIGNIFICANT CHANGE UP (ref 13–44)
MCHC RBC-ENTMCNC: 30.7 PG — SIGNIFICANT CHANGE UP (ref 27–34)
MCHC RBC-ENTMCNC: 32.8 G/DL — SIGNIFICANT CHANGE UP (ref 32–36)
MCV RBC AUTO: 93.7 FL — SIGNIFICANT CHANGE UP (ref 80–100)
MONOCYTES # BLD AUTO: 0.7 K/UL — SIGNIFICANT CHANGE UP (ref 0–0.9)
MONOCYTES NFR BLD AUTO: 6.6 % — SIGNIFICANT CHANGE UP (ref 2–14)
NEUTROPHILS # BLD AUTO: 7.39 K/UL — SIGNIFICANT CHANGE UP (ref 1.8–7.4)
NEUTROPHILS NFR BLD AUTO: 69.6 % — SIGNIFICANT CHANGE UP (ref 43–77)
NRBC # BLD: 0 /100 WBCS — SIGNIFICANT CHANGE UP (ref 0–0)
NRBC # FLD: 0 K/UL — SIGNIFICANT CHANGE UP (ref 0–0)
NT-PROBNP SERPL-SCNC: 115 PG/ML — SIGNIFICANT CHANGE UP
PLATELET # BLD AUTO: 281 K/UL — SIGNIFICANT CHANGE UP (ref 150–400)
POTASSIUM SERPL-MCNC: 3.8 MMOL/L — SIGNIFICANT CHANGE UP (ref 3.5–5.3)
POTASSIUM SERPL-SCNC: 3.8 MMOL/L — SIGNIFICANT CHANGE UP (ref 3.5–5.3)
PROT SERPL-MCNC: 6 G/DL — SIGNIFICANT CHANGE UP (ref 6–8.3)
RBC # BLD: 3.94 M/UL — LOW (ref 4.2–5.8)
RBC # FLD: 14.3 % — SIGNIFICANT CHANGE UP (ref 10.3–14.5)
RSV RNA NPH QL NAA+NON-PROBE: SIGNIFICANT CHANGE UP
SARS-COV-2 RNA SPEC QL NAA+PROBE: SIGNIFICANT CHANGE UP
SODIUM SERPL-SCNC: 140 MMOL/L — SIGNIFICANT CHANGE UP (ref 135–145)
TROPONIN T, HIGH SENSITIVITY RESULT: 14 NG/L — SIGNIFICANT CHANGE UP
TROPONIN T, HIGH SENSITIVITY RESULT: 16 NG/L — SIGNIFICANT CHANGE UP
WBC # BLD: 10.61 K/UL — HIGH (ref 3.8–10.5)
WBC # FLD AUTO: 10.61 K/UL — HIGH (ref 3.8–10.5)

## 2024-11-11 PROCEDURE — 71045 X-RAY EXAM CHEST 1 VIEW: CPT | Mod: 26

## 2024-11-11 PROCEDURE — 99285 EMERGENCY DEPT VISIT HI MDM: CPT

## 2024-11-11 PROCEDURE — 93970 EXTREMITY STUDY: CPT | Mod: 26

## 2024-11-11 RX ORDER — FUROSEMIDE 40 MG
20 TABLET ORAL ONCE
Refills: 0 | Status: DISCONTINUED | OUTPATIENT
Start: 2024-11-11 | End: 2024-11-11

## 2024-11-11 RX ORDER — CHLORDIAZEPOXIDE HCL 10 MG
50 CAPSULE ORAL ONCE
Refills: 0 | Status: DISCONTINUED | OUTPATIENT
Start: 2024-11-11 | End: 2024-11-11

## 2024-11-11 RX ADMIN — Medication 50 MILLIGRAM(S): at 03:52

## 2024-11-11 NOTE — ED PROVIDER NOTE - CLINICAL SUMMARY MEDICAL DECISION MAKING FREE TEXT BOX
59M, PMHx asthma, DM, COPD, EtOH use disorder, paroxysmal atrial fibrillation, opioid use disorder, cocaine use disorder presents to ED complaining of leg swelling x 2 days.  Patient states leg swelling is bilateral, began acutely, associated with shortness of breath.  Patient states he was seen in [facility] 3 days ago, was diagnosed with pneumonia, prescribed azithromycin.  Patient states leg swelling has continued to worsen, prompting evaluation today.  Patient denies chest pain, endorses dyspnea on exertion.  Denies fever, cough, chills.  Denies palpitations.  Denies abdominal pain, N/V/D/C, urinary or bowel symptoms, trauma.  Of note, patient states last drink was at 9 PM, patient endorses drinking 10-12 drinks per day, with maximum alcohol intake of 1-2 handles per day, last binge approximately 2.5 weeks ago.  Denies opiate, cocaine use.    Patient noted to be mildly hypertensive, otherwise hemodynamically stable and afebrile.  Lower extremities not erythematous, no induration, lowering concern for cellulitis.  Lower extremity swelling consistent with new onset HF versus venous stasis versus BL DVT.  Initially, patient has known to have history of EtOH abuse, as well as withdrawals.  Will acquire labs, chest x-ray, bilateral venous Dopplers.  Close supervision for EtOH withdrawal, will provide Librium.  Dispo per workup and reassessment.

## 2024-11-11 NOTE — ED PROVIDER NOTE - PHYSICAL EXAMINATION
Gen: AAOx3, non-toxic  HEENT: NCAT. PEERLA, EOMI, oral mucosa moist, normal conjunctiva, no tongue fasciculations   Lung: diffuse wheezes and crackles, speaking in full sentences  CV: RRR, no murmurs, rubs or gallops  Abd: soft, NTND, no guarding, no CVA tenderness  MSK: no visible deformities  Neuro: No focal sensory or motor deficits. No asterixis, no tremulousness  Skin: Warm, well perfused, no rash. 2+ pitting edema to patella b/l LE  Psych: normal affect.

## 2024-11-11 NOTE — ED ADULT NURSE NOTE - NSFALLUNIVINTERV_ED_ALL_ED
Bed/Stretcher in lowest position, wheels locked, appropriate side rails in place/Call bell, personal items and telephone in reach/Instruct patient to call for assistance before getting out of bed/chair/stretcher/Non-slip footwear applied when patient is off stretcher/[place] to call system/Physically safe environment - no spills, clutter or unnecessary equipment/Purposeful proactive rounding/Room/bathroom lighting operational, light cord in reach

## 2024-11-11 NOTE — ED PROVIDER NOTE - ATTENDING CONTRIBUTION TO CARE
59-year-old male with a history of alcohol abuse, heroin abuse, DM presenting with 2 days of bilateral lower extremity swelling.  Patient with recent hospitalization for EtOH withdrawal, left AMA on 11/2/2024.  Patient endorses drinking 2 pints of [place] a per day, last drink at 9 PM tonight.  Also used a bag of heroin today (inhaled).  Patient also reports he was seen at [facility] yesterday for shortness of breath and was diagnosed with pneumonia.  He was discharged with a Z-Tae, with which she reports compliance with taking.  Patient denies any recent fall or injury.  No fever, rash, chest pain or shortness of breath at present, abdominal pain, N/V/D, anxiety, tremulousness, hallucinations.  Patient endorses lower extremity tenderness and itchiness to touch.    Pt sitting comfortably in stretcher, awake and alert, nontoxic.  AF/VSS.  Speaking in full sentences, no bradypnea, no resp distress.  Lungs cta bl, auscultation limited by body habitus.  Cards nl S1/S2, RRR, no MRG.  Abd soft obese ntnd.  2+ bilat pitting edema to knees, R>L, LE are warm, no induration crepitus or fluctuance, normal cap refill, dp pulses intact and full bilat.  Not tremulous, no tongue fasiculations.    Consider DVT vs cardiac etiology (HF) vs metabolic disturbance vs venous stasis.  No e/o trauma or soft tissue infection.  Plan for labs, ekg, cxr, dvt study.  Pt is not in etoh withdrawal at this time, but given h/o etoh use with withdrawals will give librium now.

## 2024-11-11 NOTE — ED PROVIDER NOTE - NSFOLLOWUPINSTRUCTIONS_ED_ALL_ED_FT
You were seen in the emergency department for leg swelling.  You had blood work, an EKG, a chest x-ray, and ultrasound of your legs done.  You do not have any signs of heart failure, infection or a blood clot in the legs.  Keep your legs elevated when possible.  You should be wearing compression stockings to help minimize your leg swelling.    Drink plenty of fluids.  Avoid excessive drug and alcohol use.  You can take ibuprofen 600mg every 6 hours or Tylenol 650mg every 4 hours as needed for pain or fever.  Follow-up with your PMD in 24-48 hours.  Return to the emergency department for any new or worsening symptoms.

## 2024-11-11 NOTE — PROVIDER CONTACT NOTE (OTHER) - ASSESSMENT
The provider informed the writer that the patient is ready for discharge and requires a taxi home. The patient mentioned that he has no money to get home. The patient revealed that his doctor had informed him that he could receive a complimentary taxi ride home. A  successfully arranged for a taxi from [place].  Account [address] for 31.59.      Booking #[id number]

## 2024-11-11 NOTE — ED ADULT NURSE NOTE - OBJECTIVE STATEMENT
Patient received in 23, A&Ox3 ambulatory at baseline pmh: Patient received in 23, A&Ox3 ambulatory at baseline pmh: asthma, COPD, DM, ETOH use disorder, paroxysmal afib, opioid use disorder, cocaine use disorder presenting to ED c/o b/l leg swelling x2 days associated with SOB. Pt was seen at [facility] 3 days ago and dx with PNA. Denies CP, n/v/d, fever chills, abdominal pain, visual changes, urinary symptoms. Right 20g placed, labs drawn and sent. pending imaging

## 2024-11-11 NOTE — ED PROVIDER NOTE - PATIENT PORTAL LINK FT
You can access the FollowMyHealth Patient Portal offered by [facility] by registering at the following website: [email or web address]. By joining [facility]’s FollowMyHealth portal, you will also be able to view your health information using other applications (apps) compatible with our system.